# Patient Record
Sex: MALE | Race: BLACK OR AFRICAN AMERICAN | Employment: OTHER | URBAN - METROPOLITAN AREA
[De-identification: names, ages, dates, MRNs, and addresses within clinical notes are randomized per-mention and may not be internally consistent; named-entity substitution may affect disease eponyms.]

---

## 2018-10-10 ENCOUNTER — HOSPITAL ENCOUNTER (EMERGENCY)
Age: 33
Discharge: HOME | End: 2018-10-10
Attending: EMERGENCY MEDICINE
Payer: MEDICAID

## 2018-10-10 VITALS
SYSTOLIC BLOOD PRESSURE: 134 MMHG | TEMPERATURE: 99 F | DIASTOLIC BLOOD PRESSURE: 81 MMHG | RESPIRATION RATE: 18 BRPM | OXYGEN SATURATION: 98 % | HEART RATE: 88 BPM

## 2018-10-10 DIAGNOSIS — B34.9 VIRAL ILLNESS: Primary | ICD-10-CM

## 2018-10-10 PROCEDURE — 6370000000 HC RX 637 (ALT 250 FOR IP): Performed by: EMERGENCY MEDICINE

## 2018-10-10 PROCEDURE — 96372 THER/PROPH/DIAG INJ SC/IM: CPT

## 2018-10-10 PROCEDURE — 6360000002 HC RX W HCPCS: Performed by: EMERGENCY MEDICINE

## 2018-10-10 PROCEDURE — 99283 EMERGENCY DEPT VISIT LOW MDM: CPT

## 2018-10-10 RX ORDER — ALBUTEROL SULFATE 90 UG/1
2 AEROSOL, METERED RESPIRATORY (INHALATION) EVERY 6 HOURS PRN
Qty: 1 INHALER | Refills: 3 | Status: SHIPPED | OUTPATIENT
Start: 2018-10-10 | End: 2022-01-05

## 2018-10-10 RX ORDER — KETOROLAC TROMETHAMINE 30 MG/ML
30 INJECTION, SOLUTION INTRAMUSCULAR; INTRAVENOUS ONCE
Status: COMPLETED | OUTPATIENT
Start: 2018-10-10 | End: 2018-10-10

## 2018-10-10 RX ORDER — ONDANSETRON 8 MG/1
8 TABLET, ORALLY DISINTEGRATING ORAL EVERY 8 HOURS PRN
Qty: 10 TABLET | Refills: 0 | Status: SHIPPED | OUTPATIENT
Start: 2018-10-10 | End: 2019-12-29

## 2018-10-10 RX ORDER — ONDANSETRON 4 MG/1
4 TABLET, ORALLY DISINTEGRATING ORAL ONCE
Status: COMPLETED | OUTPATIENT
Start: 2018-10-10 | End: 2018-10-10

## 2018-10-10 RX ORDER — IPRATROPIUM BROMIDE AND ALBUTEROL SULFATE 2.5; .5 MG/3ML; MG/3ML
1 SOLUTION RESPIRATORY (INHALATION) ONCE
Status: COMPLETED | OUTPATIENT
Start: 2018-10-10 | End: 2018-10-10

## 2018-10-10 RX ADMIN — IPRATROPIUM BROMIDE AND ALBUTEROL SULFATE 1 AMPULE: .5; 3 SOLUTION RESPIRATORY (INHALATION) at 21:11

## 2018-10-10 RX ADMIN — ONDANSETRON 4 MG: 4 TABLET, ORALLY DISINTEGRATING ORAL at 21:12

## 2018-10-10 RX ADMIN — KETOROLAC TROMETHAMINE 30 MG: 30 INJECTION, SOLUTION INTRAMUSCULAR at 21:11

## 2018-10-10 ASSESSMENT — PAIN SCALES - GENERAL: PAINLEVEL_OUTOF10: 5

## 2018-10-11 NOTE — ED PROVIDER NOTES
process that require further workup at this time. His vital signs are stable no tachycardia, fever, or hypotension. Patient does not appear septic. I discussed IV, blood work, and medication versus just medication and close monitoring at home. Patient elected to forego blood work at this time, however we did discuss strict return precautions and close follow-up with his PCP for reevaluation. Both him and his sister in the room agree with the plan at this time as no further concerns or questions. ED Medication Orders     Start Ordered     Status Ordering Provider    10/10/18 2115 10/10/18 2106  ondansetron (ZOFRAN-ODT) disintegrating tablet 4 mg  ONCE      Ordered Yue RUIZ Child    10/10/18 2115 10/10/18 2106  ketorolac (TORADOL) injection 30 mg  Yue Nair Child    10/10/18 2115 10/10/18 2106  ipratropium-albuterol (DUONEB) nebulizer solution 1 ampule  ONCE      Ordered KATARZYNA RUIZ          Final Impression      1.  Viral illness      DISPOSITION Discharge - Pending Orders Complete   (Please note that portions of this note may have been completed with a voice recognition program. Efforts were made to edit the dictations but occasionally words are mis-transcribed.)    Arian Fox, DO  [facility], DO  10/10/18 6903

## 2019-12-29 ENCOUNTER — HOSPITAL ENCOUNTER (EMERGENCY)
Age: 34
Discharge: HOME | End: 2019-12-29
Payer: COMMERCIAL

## 2019-12-29 VITALS
RESPIRATION RATE: 19 BRPM | HEIGHT: 65 IN | WEIGHT: 132.5 LBS | BODY MASS INDEX: 22.08 KG/M2 | SYSTOLIC BLOOD PRESSURE: 133 MMHG | TEMPERATURE: 98.2 F | HEART RATE: 68 BPM | DIASTOLIC BLOOD PRESSURE: 91 MMHG | OXYGEN SATURATION: 99 %

## 2019-12-29 DIAGNOSIS — M62.838 TRAPEZIUS MUSCLE SPASM: ICD-10-CM

## 2019-12-29 DIAGNOSIS — M43.6 TORTICOLLIS: Primary | ICD-10-CM

## 2019-12-29 PROCEDURE — 99283 EMERGENCY DEPT VISIT LOW MDM: CPT

## 2019-12-29 RX ORDER — ACETAMINOPHEN 500 MG
1000 TABLET ORAL 4 TIMES DAILY PRN
Qty: 60 TABLET | Refills: 0 | Status: SHIPPED | OUTPATIENT
Start: 2019-12-29 | End: 2022-01-05

## 2019-12-29 RX ORDER — NAPROXEN 500 MG/1
500 TABLET ORAL 2 TIMES DAILY
Qty: 60 TABLET | Refills: 0 | Status: SHIPPED | OUTPATIENT
Start: 2019-12-29 | End: 2022-01-05

## 2019-12-29 RX ORDER — CYCLOBENZAPRINE HCL 10 MG
10 TABLET ORAL 3 TIMES DAILY PRN
Qty: 20 TABLET | Refills: 0 | Status: SHIPPED | OUTPATIENT
Start: 2019-12-29 | End: 2020-01-05

## 2019-12-29 ASSESSMENT — PAIN DESCRIPTION - ORIENTATION: ORIENTATION: LEFT

## 2019-12-29 ASSESSMENT — PAIN SCALES - GENERAL
PAINLEVEL_OUTOF10: 6
PAINLEVEL_OUTOF10: 6

## 2019-12-29 ASSESSMENT — PAIN DESCRIPTION - LOCATION: LOCATION: ARM;NECK;SHOULDER

## 2019-12-29 ASSESSMENT — PAIN DESCRIPTION - DESCRIPTORS: DESCRIPTORS: ACHING

## 2020-01-04 ENCOUNTER — HOSPITAL ENCOUNTER (EMERGENCY)
Age: 35
Discharge: HOME | End: 2020-01-04
Attending: EMERGENCY MEDICINE
Payer: COMMERCIAL

## 2020-01-04 VITALS
HEIGHT: 64 IN | OXYGEN SATURATION: 99 % | BODY MASS INDEX: 22.2 KG/M2 | SYSTOLIC BLOOD PRESSURE: 119 MMHG | RESPIRATION RATE: 16 BRPM | DIASTOLIC BLOOD PRESSURE: 71 MMHG | TEMPERATURE: 98.9 F | HEART RATE: 70 BPM | WEIGHT: 130 LBS

## 2020-01-04 PROCEDURE — 96372 THER/PROPH/DIAG INJ SC/IM: CPT

## 2020-01-04 PROCEDURE — 99282 EMERGENCY DEPT VISIT SF MDM: CPT

## 2020-01-04 PROCEDURE — 6360000002 HC RX W HCPCS: Performed by: EMERGENCY MEDICINE

## 2020-01-04 RX ORDER — KETOROLAC TROMETHAMINE 30 MG/ML
30 INJECTION, SOLUTION INTRAMUSCULAR; INTRAVENOUS ONCE
Status: COMPLETED | OUTPATIENT
Start: 2020-01-04 | End: 2020-01-04

## 2020-01-04 RX ORDER — LIDOCAINE 50 MG/G
1 PATCH TOPICAL DAILY
Qty: 10 PATCH | Refills: 0 | Status: SHIPPED | OUTPATIENT
Start: 2020-01-04 | End: 2020-01-14

## 2020-01-04 RX ADMIN — KETOROLAC TROMETHAMINE 30 MG: 30 INJECTION, SOLUTION INTRAMUSCULAR at 22:54

## 2020-01-04 ASSESSMENT — PAIN SCALES - GENERAL
PAINLEVEL_OUTOF10: 5
PAINLEVEL_OUTOF10: 9

## 2020-01-05 NOTE — ED PROVIDER NOTES
alert. Cooperative. No acute distress. HEAD: Normocephalic. Atraumatic. EYES: PERRL. EOM's grossly intact. No scleral icterus, injection or exudate  ENT: Mucous membranes are moist.  NECK: Supple. Normal ROM. Patient has tenderness to palpation primarily over his left trapezius and left deltoid muscle. Patient has no midline tenderness and minimal tenderness over the cervical paraspinal muscles. No meningismus. No neck mass. No lymphadenopathy. CHEST:  Regular rate and rhythm, no murmurs, rubs or gallops  LUNGS: Breathing is unlabored. Speaking comfortably in full sentences. Clear through auscultation bilaterally  ABDOMEN: Nondistended, nontender  EXTREMITIES: MAEE. No acute deformities. Patient has no tenderness to palpation of the left elbow or wrist.  Patient has no warmth or redness of the left shoulder joint and no pain in the shoulder with internal and external rotation with that normal drop arm test of the left shoulder. SKIN: Warm and dry. Cervical spine:  Normal sensation of the back of the head and neck bilaterally  Normal deltoid, biceps strength bilaterally  Normal strength with extension of the wrist and fingers bilaterally  Normal finger flexion strength bilaterally  Normal finger abduction strength bilaterally    Thoracic spine:  Normal sensation of the trunk bilaterally    Lumbar spine:  Normal sensation of the inner thigh bilaterally  Normal thigh adduction strength bilaterally  Normal knee extension bilaterally  Normal ankle and great toe dorsiflexion bilaterally  Normal Patellar 2/4 reflexes bilaterally  Normal plantarflexion of the toes bilaterally     ED COURSE/MDM  Cervical and trapezius strain: Patient was advised to continue his anti-inflammatory muscle relaxant and will be prescribed lidocaine patches to be used as well. Patient was advised to follow-up with physical therapy or massage therapy within the next week if symptoms are not improving.   Patient has no significant evidence of cervical radiculopathy. Patient was given scripts for the following medications. I counseled patient how to take these medications. New Prescriptions    LIDOCAINE (LIDODERM) 5 %    Place 1 patch onto the skin daily for 10 days 12 hours on, 12 hours off. CLINICAL IMPRESSION  1. Trapezius strain, left, sequela        Blood pressure 121/79, pulse 78, temperature 98.9 °F (37.2 °C), temperature source Oral, resp. rate 16, height 5' 4\" (1.626 m), weight 130 lb (59 kg), SpO2 99 %.       Follow-up with:  [address] [place] [address]              Delma Quispe MD  01/04/20 5908

## 2020-01-17 ENCOUNTER — OFFICE VISIT (OUTPATIENT)
Dept: ORTHOPEDIC SURGERY | Age: 35
End: 2020-01-17
Payer: COMMERCIAL

## 2020-01-17 VITALS
SYSTOLIC BLOOD PRESSURE: 129 MMHG | HEIGHT: 64 IN | DIASTOLIC BLOOD PRESSURE: 77 MMHG | WEIGHT: 130 LBS | TEMPERATURE: 97.8 F | HEART RATE: 77 BPM | BODY MASS INDEX: 22.2 KG/M2

## 2020-01-17 PROBLEM — M54.2 NECK PAIN: Status: ACTIVE | Noted: 2020-01-17

## 2020-01-17 PROBLEM — M54.12 CERVICAL RADICULAR PAIN: Status: ACTIVE | Noted: 2020-01-17

## 2020-01-17 PROBLEM — M50.322 DEGENERATION OF C5-C6 INTERVERTEBRAL DISC: Status: ACTIVE | Noted: 2020-01-17

## 2020-01-17 PROCEDURE — 1036F TOBACCO NON-USER: CPT | Performed by: PHYSICIAN ASSISTANT

## 2020-01-17 PROCEDURE — G8420 CALC BMI NORM PARAMETERS: HCPCS | Performed by: PHYSICIAN ASSISTANT

## 2020-01-17 PROCEDURE — 99203 OFFICE O/P NEW LOW 30 MIN: CPT | Performed by: PHYSICIAN ASSISTANT

## 2020-01-17 PROCEDURE — G8427 DOCREV CUR MEDS BY ELIG CLIN: HCPCS | Performed by: PHYSICIAN ASSISTANT

## 2020-01-17 PROCEDURE — G8484 FLU IMMUNIZE NO ADMIN: HCPCS | Performed by: PHYSICIAN ASSISTANT

## 2020-01-17 RX ORDER — CYCLOBENZAPRINE HCL 10 MG
10 TABLET ORAL 3 TIMES DAILY PRN
Qty: 90 TABLET | Refills: 0 | Status: SHIPPED | OUTPATIENT
Start: 2020-01-17 | End: 2020-02-16

## 2020-01-17 RX ORDER — METHYLPREDNISOLONE 4 MG/1
TABLET ORAL
Qty: 1 KIT | Refills: 0 | Status: SHIPPED | OUTPATIENT
Start: 2020-01-17 | End: 2022-01-05

## 2020-01-17 NOTE — LETTER
[facility]  [facility] [address] [address]  Phone: [phone]  Fax: [phone]    Basilio Moran, [address]        January 17, 2020     Patient: Jacqueline Rubio   YOB: 1985   Date of Visit: 1/17/2020       To Whom It May Concern: It is my medical opinion that Jacqueline Rubio may return to light duty immediately with the following restrictions: lifting/carrying not to exceed 5 lbs. , pushing/pulling not to exceed 5 lbs. If you have any questions or concerns, please don't hesitate to call.     Sincerely,          SHELLIE Snow

## 2020-01-20 NOTE — PROGRESS NOTES
needed for Muscle spasms 90 tablet 0    naproxen (NAPROSYN) 500 MG tablet Take 1 tablet by mouth 2 times daily 60 tablet 0    acetaminophen (TYLENOL) 500 MG tablet Take 2 tablets by mouth 4 times daily as needed for Pain 60 tablet 0    albuterol sulfate HFA (PROAIR HFA) 108 (90 Base) MCG/ACT inhaler Inhale 2 puffs into the lungs every 6 hours as needed for Wheezing 1 Inhaler 3     No current facility-administered medications for this visit. Objective:     He is alert, oriented x 3, pleasant, well nourished, developed and in no   acute distress. /77   Pulse 77   Temp 97.8 °F (36.6 °C)   Ht 5' 4\" (1.626 m)   Wt 130 lb (59 kg)   BMI 22.31 kg/m²      Cervical Spine Exam:  There is not deformity. There is not loss of motion. There is  muscular spasm. There is  trapezius/ rhomboid tenderness. There is not spinous process tenderness. There is not cervical lymphadenopathy. Spurling Test is Positive. Examination of the left shoulder shows: There is no deformity. There is no erythema. There is no  soft tissue swelling. Deltoid region is not tender to palpation. AC Joint is not tender to palpation. Clavicle is not tender to palpation. Bicipital Groove is not  tender to palpation. Pectoralis  is not tender to palpation. Scapula/ trapezius is not tender to palpation. There is no weakness with supraspinatus testing. There is no pain with supraspinatus testing. Yergason Test negative. Drop Arm Test negative. Apprehension Test negative. Cross Arm Test negative. Shoulder ROM-      Full range of motion in all planes without pain or instability. Examination of the upper extremities are intact with sensation to light touch. Motor testing  5/5 in all major motor groups including hand intrinsics. DTRs-left biceps 1+ whereas all others including right biceps, bilateral brachioradialis and triceps 2+. Radial, Median and Ulnar nerves are intact. Horan's Sign absent.

## 2020-01-21 ENCOUNTER — HOSPITAL ENCOUNTER (OUTPATIENT)
Dept: PHYSICAL THERAPY | Age: 35
Setting detail: THERAPIES SERIES
Discharge: HOME | End: 2020-01-21
Payer: COMMERCIAL

## 2020-01-21 PROCEDURE — 97012 MECHANICAL TRACTION THERAPY: CPT

## 2020-01-21 PROCEDURE — 97161 PT EVAL LOW COMPLEX 20 MIN: CPT

## 2020-01-21 PROCEDURE — 97035 APP MDLTY 1+ULTRASOUND EA 15: CPT

## 2020-01-21 PROCEDURE — 97530 THERAPEUTIC ACTIVITIES: CPT

## 2020-01-21 NOTE — PROGRESS NOTES
Physical Therapy  Initial Assessment  Date: 2020  Patient Name: Trinh Casanova  MRN: [id number]  : 1985     Treatment Diagnosis: neck pain with L UE radicular pain; dec ROM and strength; dec postural awareness     Restrictions  Position Activity Restriction  Other position/activity restrictions: no fall risk     Subjective   General  Chart Reviewed: Yes  Patient assessed for rehabilitation services?: Yes  Additional Pertinent Hx: asthma, PCN allergy   Family / Caregiver Present: No  Referring Practitioner: Angie Alvarado   Referral Date : 20  Diagnosis: cervical radicular pain; degeneration C5-6 disc   General Comment  Comments: xrays show DDD at C5-6; if no relief with PT will possible try GIN   PT Visit Information  Onset Date: 12/15/19  PT Insurance Information: [facility]   Total # of Visits Approved: 8  Total # of Visits to Date: 1  Progress Note Counter: 1/10  Subjective  Subjective: 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 Since it started, pain is intermittent and at times 0/10 and up to 6/10. When it starts, it can last for hours and he also gets L UE pain and fingertips. Certain neck positions increase his UE pain ( extension ). Has not tried MHP or CP, but has tried an icy hot with some relief.        Objective     Observation/Palpation  Palpation: TTP - no specific TTP ( points out pain being in UT area when hurting)     AROM RUE (degrees)  RUE AROM : WNL  AROM LUE (degrees)  LUE AROM : WFL  LUE General AROM: mild strain with IR felt   Spine  Cervical: flex 50 no s/s; ext 50 with inc in L upper arm pain; L SB 45 no pain; R SB 45 no pain; L Rot 80 no c/o;  R Rot 70 with mild tingling in L hand     Strength RUE  Strength RUE: WNL  Strength LUE  Strength LUE: WNL        Assessment   Conditions Requiring Skilled Therapeutic Intervention  Body structures, Functions, Activity limitations: Decreased functional mobility ;Decreased high-level IADLs;Decreased ADL status; Increased pain;Decreased ROM  Assessment: prior level of function: pt able to sleep and work, play with kids without pain; DX: cervical radicular pain, degeneration of C5-6 intervertebral disc   Treatment Diagnosis: neck pain with L UE radicular pain; dec ROM and strength; dec postural awareness   Prognosis: Good  Decision Making: Low Complexity  History: see PMH  Exam: see eval   Clinical Presentation: stable   REQUIRES PT FOLLOW UP: Yes  Activity Tolerance  Activity Tolerance: Patient Tolerated treatment well         Plan   Plan  Times per week: 2  Plan weeks: 4  Current Treatment Recommendations: Strengthening, Manual Therapy - Joint Manipulation, Patient/Caregiver Education & Training, ROM, Modalities, Pain Management, Home Exercise Program, Manual Therapy - Soft Tissue Mobilization, Safety Education & Training    OutComes Score  Neck Disability Index Raw Score: 15 (01/21/20 1056)                     Goals  Short term goals  Time Frame for Short term goals: 2 wks  Short term goal 1: pain overall 20-30% improved per pt   Short term goal 2: pt notes inc awareness of posure with work and ADLS   Long term goals  Time Frame for Long term goals : discharge  Long term goal 1: pain overall 50-60% improved per pt for ease with sleep and childcare   Long term goal 2: pt independent with hep   Long term goal 3: full ROM neck and L UE without discomfort   Patient Goals   Patient goals : \" to make my body feel better for I can get to normal life and be involved with kids\"     Leandro Stevenson, [id number]

## 2020-01-23 ENCOUNTER — APPOINTMENT (OUTPATIENT)
Dept: PHYSICAL THERAPY | Age: 35
End: 2020-01-23
Payer: COMMERCIAL

## 2020-01-23 ENCOUNTER — HOSPITAL ENCOUNTER (OUTPATIENT)
Dept: PHYSICAL THERAPY | Age: 35
Setting detail: THERAPIES SERIES
Discharge: HOME | End: 2020-01-23
Payer: COMMERCIAL

## 2020-01-23 PROCEDURE — 97035 APP MDLTY 1+ULTRASOUND EA 15: CPT

## 2020-01-23 PROCEDURE — 97110 THERAPEUTIC EXERCISES: CPT

## 2020-01-23 PROCEDURE — 97140 MANUAL THERAPY 1/> REGIONS: CPT

## 2020-01-23 NOTE — FLOWSHEET NOTE
shoulder which stopped as soon as c-tx stopped     1/23: discomfort L shoulder afterwards. Consider fewer pulls next visit        MHP after  12 min                Other Therapeutic Activities:  Pt was educated on PT POC, Diagnosis, Prognosis, pathomechanics as well as frequency and duration of scheduling future physical therapy appointments. Time was also taken on this day to answer all patient questions and participation in PT. Reviewed appointment policy in detail with patient and patient verbalized understanding. Home Exercise Program: Patient was instructed in the above for HEP: . Patient verbalized/demonstrated understanding and was issued written handout. Charges: Therapeutic Exercise:  [] (67566) Provided verbal/tactile cueing for activities to restore or maintain strength, flexibility, endurance, ROM for improvements with self-care, mobility, lifting and ambulation. Neuromuscular Re-Education  [] (76982) Provided verbal/tactile cueing for activities to restore or maintain balance, coordination, kinesthetic sense, posture, motor skill, proprioception for self-care, mobility, lifting, and ambulation. Therapeutic Activities:    [x] (33757) Provided verbal/tactile cueing to address functional limitations related to loss of mobility, strength, balance, and coordination.      Gait Training:  [] (46639) Provided training and instruction to the patient for proper postural muscle recruitment and positioning with ambulation re-education     Home Exercise Program:    [x] (97027) Reviewed/Progressed HEP activities related to strengthening, flexibility, endurance, ROM for functional self-care, mobility, lifting and ambulation   [] (03302) Reviewed/Progressed HEP activities related to improving balance, coordination, kinesthetic sense, posture, motor skill, proprioception for self-care, mobility, lifting, and ambulation      Manual Treatments:  MFR / STM / Oscillations-Mobs:  G-I, II, III, IV /

## 2020-01-28 ENCOUNTER — HOSPITAL ENCOUNTER (OUTPATIENT)
Dept: PHYSICAL THERAPY | Age: 35
Setting detail: THERAPIES SERIES
Discharge: HOME | End: 2020-01-28
Payer: COMMERCIAL

## 2020-01-28 NOTE — FLOWSHEET NOTE
Physical Therapy  Cancellation/No-show Note  Patient Name:  Paris Rutledge  :  1985   Date:  2020  Cancelled visits to date: 1  No-shows to date: 0    For today's appointment patient:  [x]  Cancelled  []  Rescheduled appointment  []  No-show     Reason given by patient:  []  Patient ill  []  Conflicting appointment  [x]  No transportation - having car trouble    []  Conflict with work  []  No reason given  []  Other:     Comments:      Electronically signed by:  Olivia Rodriguez PTA

## 2020-01-30 ENCOUNTER — HOSPITAL ENCOUNTER (OUTPATIENT)
Dept: PHYSICAL THERAPY | Age: 35
Setting detail: THERAPIES SERIES
Discharge: HOME | End: 2020-01-30
Payer: COMMERCIAL

## 2020-01-30 PROCEDURE — 97035 APP MDLTY 1+ULTRASOUND EA 15: CPT | Performed by: CHIROPRACTOR

## 2020-01-30 PROCEDURE — 97140 MANUAL THERAPY 1/> REGIONS: CPT | Performed by: CHIROPRACTOR

## 2020-01-30 NOTE — FLOWSHEET NOTE
Physical Therapy Daily Treatment Note  Date:  2020    Patient Name:  Paris Rutledge    :  1985  MRN: [id number]    Restrictions/Precautions: Position Activity Restriction  Other position/activity restrictions: no fall risk     Pertinent Medical History: Additional Pertinent Hx: asthma, PCN allergy     Medical/Treatment Diagnosis Information:  · Diagnosis: cervical radicular pain; degeneration C5-6 disc   · Treatment Diagnosis: neck pain with L UE radicular pain; dec ROM and strength; dec postural awareness     Insurance/Certification information:  PT Insurance Information: [facility]   Physician Information:  Referring Practitioner: Chiqui Tovar   Plan of care signed (Y/N):  Sent to inbox    Visit# / total visits:    Pain level: 2/10     Functional Outcomes Measure: at eval  Test: NDI  Score: 15    History of Injury: Subjective  Subjective: 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 Since it started, pain is intermittent and at times 0/10 and up to 6/10. When it starts, it can last for hours and he also gets L UE pain and fingertips. Certain neck positions increase his UE pain ( extension ). Has not tried MHP or CP, but has tried an icy hot with some relief. Subjective:   Just has a funny feeling in his L Upper arm; numbness/tingling.  Neck and shoulder is doing pretty good    Objective:   Observation:    Test measurements:      Exercises:  Exercise/Equipment Resistance/Repetitions Other comments        L UT str No stretch felt    L LS str 3 x 20 sec    Cervical retraction 5 sec x 10    TB Row        Ext  Green x10  Green x10    pec str 3 x 20 sec         STM L UT/CPVM at C5-6, C6-7 X 10 min    % 1.5 w/cm2 to CPVM  X 8 min With relief of pain         [facility]        Trial manual c-tx          Neck straight - 5 x 20 sec  Neck slight R SB -  Stopped after 7 min due to pt c/o inc \"uncomfortable\" pain in L shoulder which stopped as soon as c-tx stopped     1/23: discomfort L shoulder afterwards. Consider fewer pulls next visit     States that pain increases in Supine   MHP after  12 min      Feels better after HP          Other Therapeutic Activities:  Pt was educated on PT POC, Diagnosis, Prognosis, pathomechanics as well as frequency and duration of scheduling future physical therapy appointments. Time was also taken on this day to answer all patient questions and participation in PT. Reviewed appointment policy in detail with patient and patient verbalized understanding. Home Exercise Program: Patient was instructed in the above for HEP: . Patient verbalized/demonstrated understanding and was issued written handout. Charges: Therapeutic Exercise:  [] (43505) Provided verbal/tactile cueing for activities to restore or maintain strength, flexibility, endurance, ROM for improvements with self-care, mobility, lifting and ambulation. Neuromuscular Re-Education  [] (57711) Provided verbal/tactile cueing for activities to restore or maintain balance, coordination, kinesthetic sense, posture, motor skill, proprioception for self-care, mobility, lifting, and ambulation. Therapeutic Activities:    [x] (03597) Provided verbal/tactile cueing to address functional limitations related to loss of mobility, strength, balance, and coordination.      Gait Training:  [] (02413) Provided training and instruction to the patient for proper postural muscle recruitment and positioning with ambulation re-education     Home Exercise Program:    [x] (73299) Reviewed/Progressed HEP activities related to strengthening, flexibility, endurance, ROM for functional self-care, mobility, lifting and ambulation   [] (43443) Reviewed/Progressed HEP activities related to improving balance, coordination, kinesthetic sense, posture, motor skill, proprioception for self-care, mobility, lifting, and ambulation      Manual Treatments:  MFR

## 2020-02-04 ENCOUNTER — HOSPITAL ENCOUNTER (OUTPATIENT)
Dept: PHYSICAL THERAPY | Age: 35
Setting detail: THERAPIES SERIES
Discharge: HOME | End: 2020-02-04
Payer: COMMERCIAL

## 2020-02-04 PROCEDURE — 97140 MANUAL THERAPY 1/> REGIONS: CPT

## 2020-02-04 PROCEDURE — 97035 APP MDLTY 1+ULTRASOUND EA 15: CPT

## 2020-02-04 NOTE — FLOWSHEET NOTE
Physical Therapy Daily Treatment Note  Date:  2020    Patient Name:  Ira Dunn    :  1985  MRN: [id number]    Restrictions/Precautions: Position Activity Restriction  Other position/activity restrictions: no fall risk     Pertinent Medical History: Additional Pertinent Hx: asthma, PCN allergy     Medical/Treatment Diagnosis Information:  · Diagnosis: cervical radicular pain; degeneration C5-6 disc   · Treatment Diagnosis: neck pain with L UE radicular pain; dec ROM and strength; dec postural awareness     Insurance/Certification information:  PT Insurance Information: [facility]   Physician Information:  Referring Practitioner: Jose Antonio Rubio   Plan of care signed (Y/N):  Sent to inbox    Visit# / total visits:  3/8 (running 15 min late)  Pain level: 1-2/10     Functional Outcomes Measure: at eval  Test: NDI  Score: 15    History of Injury: Subjective  Subjective: 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 Since it started, pain is intermittent and at times 0/10 and up to 6/10. When it starts, it can last for hours and he also gets L UE pain and fingertips. Certain neck positions increase his UE pain ( extension ). Has not tried MHP or CP, but has tried an icy hot with some relief. Subjective:   Feels PT is helping, pain is not as frequent as it used to be. Now having s/s 3-4 times a week instead of every morning. Not really a pain, but a tingling in shoulder now.      Objective:   Observation:    Test measurements:      Exercises:  Exercise/Equipment Resistance/Repetitions Other comments        L UT str No stretch felt    L LS str 3 x 20 sec    Cervical retraction 5 sec x 10    TB Row        Ext  Green x10  Green x10 2/4 issued blue TB    pec str 3 x 20 sec         STM L UT/CPVM at C5-6, C6-7 X 8 min    % 1.5 w/cm2 to CPVM  X 8 min No tingling in L UE after STM/US         trial [facility] c-tx with MHP        Trial manual c-tx           Stopped after 7 min due to pt c/o inc \"uncomfortable\" pain in L shoulder which stopped as soon as c-tx stopped     1/23: discomfort L shoulder afterwards. Consider fewer pulls next visit     States that pain increases in Supine   MHP after  15 min draped over L UT seated       Feels better after HP          Other Therapeutic Activities:  Pt was educated on PT POC, Diagnosis, Prognosis, pathomechanics as well as frequency and duration of scheduling future physical therapy appointments. Time was also taken on this day to answer all patient questions and participation in PT. Reviewed appointment policy in detail with patient and patient verbalized understanding. Home Exercise Program: Patient was instructed in the above for HEP: . Patient verbalized/demonstrated understanding and was issued written handout. Charges: Therapeutic Exercise:  [] (16645) Provided verbal/tactile cueing for activities to restore or maintain strength, flexibility, endurance, ROM for improvements with self-care, mobility, lifting and ambulation. Neuromuscular Re-Education  [] (48437) Provided verbal/tactile cueing for activities to restore or maintain balance, coordination, kinesthetic sense, posture, motor skill, proprioception for self-care, mobility, lifting, and ambulation. Therapeutic Activities:    [x] (39919) Provided verbal/tactile cueing to address functional limitations related to loss of mobility, strength, balance, and coordination.      Gait Training:  [] (65345) Provided training and instruction to the patient for proper postural muscle recruitment and positioning with ambulation re-education     Home Exercise Program:    [x] (98453) Reviewed/Progressed HEP activities related to strengthening, flexibility, endurance, ROM for functional self-care, mobility, lifting and ambulation   [] (04556) Reviewed/Progressed HEP activities related to improving balance, coordination, kinesthetic sense, posture,

## 2020-02-06 ENCOUNTER — HOSPITAL ENCOUNTER (OUTPATIENT)
Dept: PHYSICAL THERAPY | Age: 35
Setting detail: THERAPIES SERIES
Discharge: HOME | End: 2020-02-06
Payer: COMMERCIAL

## 2020-02-06 PROCEDURE — 97140 MANUAL THERAPY 1/> REGIONS: CPT

## 2020-02-06 PROCEDURE — 97110 THERAPEUTIC EXERCISES: CPT

## 2020-02-06 PROCEDURE — 97035 APP MDLTY 1+ULTRASOUND EA 15: CPT

## 2020-02-06 NOTE — FLOWSHEET NOTE
Physical Therapy Daily Treatment Note  Date:  2020    Patient Name:  Virginia Ruggiero    :  1985  MRN: [id number]    Restrictions/Precautions: Position Activity Restriction  Other position/activity restrictions: no fall risk     Pertinent Medical History: Additional Pertinent Hx: asthma, PCN allergy     Medical/Treatment Diagnosis Information:  · Diagnosis: cervical radicular pain; degeneration C5-6 disc   · Treatment Diagnosis: neck pain with L UE radicular pain; dec ROM and strength; dec postural awareness     Insurance/Certification information:  PT Insurance Information: [facility]   Physician Information:  Referring Practitioner: Vicky Longoria   Plan of care signed (Y/N):  Sent to inbox    Visit# / total visits:    Pain level: 1/10     Functional Outcomes Measure: at eval  Test: NDI  Score: 15    History of Injury: Subjective  Subjective: 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 Since it started, pain is intermittent and at times 0/10 and up to 6/10. When it starts, it can last for hours and he also gets L UE pain and fingertips. Certain neck positions increase his UE pain ( extension ). Has not tried MHP or CP, but has tried an icy hot with some relief.       Subjective:   Hasn't been having much pain, just a little tingling in his L biceps  Objective:   Observation:    Test measurements:      Exercises:  Exercise/Equipment Resistance/Repetitions Other comments        L UT str No stretch felt    L LS str 3 x 20 sec    Cervical retraction 5 sec x 10    TB Row        Ext  Green 2x10  Green 2x10 2/4 issued blue TB    pec str 3 x 20 sec         STM L UT/CPVM at C5-6, C6-7 X 8 min    % 1.5 w/cm2 to CPVM  X 8 min No tingling in L UE after STM/US         trial [facility] c-tx with MHP        Trial manual c-tx           Stopped after 7 min due to pt c/o inc \"uncomfortable\" pain in L shoulder which stopped as soon as c-tx stopped 1/23: discomfort L shoulder afterwards. Consider fewer pulls next visit     States that pain increases in Supine   MHP after  15 min draped over L UT seated       Feels better after HP   kiniseotape L UT + (band-aid)      Other Therapeutic Activities:  Pt was educated on PT POC, Diagnosis, Prognosis, pathomechanics as well as frequency and duration of scheduling future physical therapy appointments. Time was also taken on this day to answer all patient questions and participation in PT. Reviewed appointment policy in detail with patient and patient verbalized understanding. Home Exercise Program: Patient was instructed in the above for HEP: . Patient verbalized/demonstrated understanding and was issued written handout. Charges: Therapeutic Exercise:  [] (13210) Provided verbal/tactile cueing for activities to restore or maintain strength, flexibility, endurance, ROM for improvements with self-care, mobility, lifting and ambulation. Neuromuscular Re-Education  [] (57439) Provided verbal/tactile cueing for activities to restore or maintain balance, coordination, kinesthetic sense, posture, motor skill, proprioception for self-care, mobility, lifting, and ambulation. Therapeutic Activities:    [x] (08265) Provided verbal/tactile cueing to address functional limitations related to loss of mobility, strength, balance, and coordination.      Gait Training:  [] (64841) Provided training and instruction to the patient for proper postural muscle recruitment and positioning with ambulation re-education     Home Exercise Program:    [x] (77924) Reviewed/Progressed HEP activities related to strengthening, flexibility, endurance, ROM for functional self-care, mobility, lifting and ambulation   [] (59096) Reviewed/Progressed HEP activities related to improving balance, coordination, kinesthetic sense, posture, motor skill, proprioception for self-care, mobility, lifting, and ambulation      Manual

## 2020-02-11 ENCOUNTER — HOSPITAL ENCOUNTER (OUTPATIENT)
Dept: PHYSICAL THERAPY | Age: 35
Setting detail: THERAPIES SERIES
Discharge: HOME | End: 2020-02-11
Payer: COMMERCIAL

## 2020-02-11 PROCEDURE — 97035 APP MDLTY 1+ULTRASOUND EA 15: CPT

## 2020-02-11 PROCEDURE — 97140 MANUAL THERAPY 1/> REGIONS: CPT

## 2020-02-11 PROCEDURE — 97530 THERAPEUTIC ACTIVITIES: CPT

## 2020-02-11 NOTE — PROGRESS NOTES
Outpatient Physical Therapy  [] [place]    Phone: [phone]   Fax: [phone]   [] [facility]  Phone: [phone]   Fax: [phone]  [] [facility]              Phone: [phone]   Fax: [phone]     Physical Therapy Progress/Discharge Note  Date: 2020        Patient Name:  Cuong Rush    :  1985  MRN: [id number]  Restrictions/Precautions: Position Activity Restriction  Other position/activity restrictions: no fall risk     Pertinent Medical History: Additional Pertinent Hx: asthma, PCN allergy      Medical/Treatment Diagnosis Information:  · Diagnosis: cervical radicular pain; degeneration C5-6 disc   · Treatment Diagnosis: neck pain with L UE radicular pain; dec ROM and strength; dec postural awareness      Insurance/Certification information:  PT Insurance Information: [facility] [place]   Physician Information:  Referring Practitioner: John Seay   Plan of care signed (Y/N):  Sent to inbox     Visit# / total visits:    Pain level:      1/10            Time Period for Report:  20-20  Cancels/No-shows to date:  1    Plan of Care/Treatment to date:  [x] Therapeutic Exercise    [x] Modalities:  [x] Therapeutic Activity     [x] Ultrasound  [] Electrical Stimulation  [] Gait Training      [] Cervical Traction    [] Lumbar Traction  [] Neuromuscular Re-education  [] Cold/hotpack [] Iontophoresis  [x] Instruction in HEP      Other:  [x] Manual Therapy       []    [] Aquatic Therapy       []                          Significant Findings At Last Visit/Comments:     Progress Note (20)  * PT is helping; relief after sessions lasts for the rest of the day  * overall feels 75-80% improved  * no longer having neck pain; only a tightness now and the uncomfortable sensation L upper arm  * NT in L bicep/deltoid is less and intermittent now, not constant; more of an uncomfortable pressure   * inc postural awareness with work and ADLS   * sleep is better, but still having some bad

## 2020-02-13 ENCOUNTER — HOSPITAL ENCOUNTER (OUTPATIENT)
Dept: PHYSICAL THERAPY | Age: 35
Setting detail: THERAPIES SERIES
Discharge: HOME | End: 2020-02-13
Payer: COMMERCIAL

## 2020-02-13 PROCEDURE — 97035 APP MDLTY 1+ULTRASOUND EA 15: CPT

## 2020-02-13 PROCEDURE — 97140 MANUAL THERAPY 1/> REGIONS: CPT

## 2020-02-13 NOTE — FLOWSHEET NOTE
Physical Therapy Daily Treatment Note  Date:  2020    Patient Name:  Brice Gonzalez    :  1985  MRN: [id number]    Restrictions/Precautions: Position Activity Restriction  Other position/activity restrictions: no fall risk     Pertinent Medical History: Additional Pertinent Hx: asthma, PCN allergy     Medical/Treatment Diagnosis Information:  · Diagnosis: cervical radicular pain; degeneration C5-6 disc   · Treatment Diagnosis: neck pain with L UE radicular pain; dec ROM and strength; dec postural awareness     Insurance/Certification information:  PT Insurance Information: [facility]   Physician Information:  Referring Practitioner: Sandoval Goodrich   Plan of care signed (Y/N):  Sent to inbox    Visit# / total visits:    Pain level: 0/10     Functional Outcomes Measure: at eval  Test: NDI  Score: 15    History of Injury: Subjective  Subjective: 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 Since it started, pain is intermittent and at times 0/10 and up to 6/10. When it starts, it can last for hours and he also gets L UE pain and fingertips. Certain neck positions increase his UE pain ( extension ). Has not tried MHP or CP, but has tried an icy hot with some relief. Subjective:   Feeling pretty good, no tingling, just a small spot L bicep area. Doesn't even feel really tight or tense right now.      Progress Note (20)  * PT is helping; relief after sessions lasts for the rest of the day  * overall feels 75-80% improved  * no longer having neck pain; only a tightness now and the uncomfortable sensation L upper arm  * NT in L bicep/deltoid is less and intermittent now, not constant; more of an uncomfortable pressure   * inc postural awareness with work and ADLS   * sleep is better, but still having some bad nights; difficulty laying on L shoulder still  * neck ROM WNL only movement that inc s/s is R Other:  Functional assessment: tightness improving   Prognosis: [x] Good [] Fair  [] Poor    Goals:    Short term goals  Time Frame for Short term goals: 2 wks  Short term goal 1: pain overall 20-30% improved per pt   Short term goal 2: pt notes inc awareness of posure with work and [address] term goals  Time Frame for Long term goals : discharge  Long term goal 1: pain overall 50-60% improved per pt for ease with sleep and childcare   Long term goal 2: pt independent with hep   Long term goal 3: full ROM neck and L UE without discomfort      Patient Requires Follow-up: [x] Yes  [] No    Plan:   [x] Continue per plan of care [] Alter current plan (see comments)  [] Plan of care initiated [] Hold pending MD visit [] Discharge    Plan for Next Session:  Add above as stated; cont x 2 wks    Electronically signed by:  Maricruz Samaniego, [id number] [address]

## 2020-02-20 ENCOUNTER — HOSPITAL ENCOUNTER (OUTPATIENT)
Dept: PHYSICAL THERAPY | Age: 35
Setting detail: THERAPIES SERIES
Discharge: HOME | End: 2020-02-20
Payer: COMMERCIAL

## 2020-02-20 PROCEDURE — 97035 APP MDLTY 1+ULTRASOUND EA 15: CPT

## 2020-02-20 PROCEDURE — 97140 MANUAL THERAPY 1/> REGIONS: CPT

## 2020-02-20 NOTE — FLOWSHEET NOTE
Physical Therapy Daily Treatment Note  Date:  2020    Patient Name:  Franco Solis    :  1985  MRN: [id number]    Restrictions/Precautions: Position Activity Restriction  Other position/activity restrictions: no fall risk     Pertinent Medical History: Additional Pertinent Hx: asthma, PCN allergy     Medical/Treatment Diagnosis Information:  · Diagnosis: cervical radicular pain; degeneration C5-6 disc   · Treatment Diagnosis: neck pain with L UE radicular pain; dec ROM and strength; dec postural awareness     Insurance/Certification information:  PT Insurance Information: [facility]   Physician Information:  Referring Practitioner: Jaime Mcgovern   Plan of care signed (Y/N):  Sent to inbox    Visit# / total visits:    Pain level: 0/10     Functional Outcomes Measure: at eval  Test: NDI  Score: 15    History of Injury: Subjective  Subjective: 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 Since it started, pain is intermittent and at times 0/10 and up to 6/10. When it starts, it can last for hours and he also gets L UE pain and fingertips. Certain neck positions increase his UE pain ( extension ). Has not tried MHP or CP, but has tried an icy hot with some relief. Subjective:   Mild tingling L UE just came on now as he was waiting to come back to PT. Overall, less frequency of UE tingling noted. Relief after PT for most of the whole day.      Progress Note (20)  * PT is helping; relief after sessions lasts for the rest of the day  * overall feels 75-80% improved  * no longer having neck pain; only a tightness now and the uncomfortable sensation L upper arm  * NT in L bicep/deltoid is less and intermittent now, not constant; more of an uncomfortable pressure   * inc postural awareness with work and ADLS   * sleep is better, but still having some bad nights; difficulty laying on L shoulder still  * neck ROM WNL only movement that inc s/s is R rot      Objective:   Observation:    Test measurements:      Exercises:  Exercise/Equipment Resistance/Repetitions Other comments        L UT str No stretch felt    L LS str Hep - done this morning    Cervical retraction    TB Row        Ext  2/4 issued blue TB    pec str  Performed 2/11 - hadn't done yet   Cervical isometrics  For hep         STM L UT/CPVM at C5-6, C6-7 IASTM X 8 min    B Rot after treatment without N/T Demo use of theracane x 3 min and issued info on where to purchase if intertested    % 1.5 w/cm2 to CPVM  X 8 min No tingling in L UE after STM/US         trial again manual mobs and cerv ROM since pt feeling better        trial mech c-tx with MHP        Trial manual c-tx           D/C all attempts             Stopped after 7 min due to pt c/o inc \"uncomfortable\" pain in L shoulder which stopped as soon as c-tx stopped     1/23: discomfort L shoulder afterwards. Consider fewer pulls next visit     States that pain increases in Supine   MHP after  15 min around neck seated       Feels better after HP   kiniseotape  Declined, feels good right now      Other Therapeutic Activities:  Pt was educated on PT POC, Diagnosis, Prognosis, pathomechanics as well as frequency and duration of scheduling future physical therapy appointments. Time was also taken on this day to answer all patient questions and participation in PT. Reviewed appointment policy in detail with patient and patient verbalized understanding. Home Exercise Program: Patient was instructed in the above for HEP: . Patient verbalized/demonstrated understanding and was issued written handout. Charges: Therapeutic Exercise:  [] (17033) Provided verbal/tactile cueing for activities to restore or maintain strength, flexibility, endurance, ROM for improvements with self-care, mobility, lifting and ambulation.     Neuromuscular Re-Education  [] (02950) Provided verbal/tactile cueing for activities to restore or maintain balance, coordination, kinesthetic sense, posture, motor skill, proprioception for self-care, mobility, lifting, and ambulation. Therapeutic Activities:    [x] (54373) Provided verbal/tactile cueing to address functional limitations related to loss of mobility, strength, balance, and coordination. Gait Training:  [] (37066) Provided training and instruction to the patient for proper postural muscle recruitment and positioning with ambulation re-education     Home Exercise Program:    [x] (39675) Reviewed/Progressed HEP activities related to strengthening, flexibility, endurance, ROM for functional self-care, mobility, lifting and ambulation   [] (71023) Reviewed/Progressed HEP activities related to improving balance, coordination, kinesthetic sense, posture, motor skill, proprioception for self-care, mobility, lifting, and ambulation      Manual Treatments:  MFR / STM / Oscillations-Mobs:  G-I, II, III, IV / Manipulation / MLD  [x] (59470) Provided manual therapy to mobilize  soft tissue/joints/fluid for the purpose of modulating pain, promoting relaxation, increasing ROM, reducing/eliminating soft tissue swelling/inflammation/restriction, improving soft tissue extensibility and allowing for proper ROM for normal function with self- care, mobility, lifting and ambulation.       Timed Code Treatment Minutes: 30   Total Treatment Minutes: 45     [] EVAL (LOW) 42615   [] EVAL (MOD) 16529   [] EVAL (HIGH) 73550   [] RE-EVAL   [] TE (78563) x     [] Aquatic (95825) x  [] NMR (45836)   x  [] Aquatic Group (43367) x  [x] Manual (56389) x    [x] Ultrasound (91092) x  [] TA (62941) x1  [] Mech Traction (19874)  [] Ionto (05688)           [] ES (un) (28778):   [] Vasopump (31148) [] Other:      Assessment:  [x] Patient tolerated treatment well [] Patient limited by fatigue  [] Patient limited by pain  [] Patient limited by other medical complications  [x] Other:  Functional assessment: Can turn head to R now

## 2020-02-25 ENCOUNTER — HOSPITAL ENCOUNTER (OUTPATIENT)
Dept: PHYSICAL THERAPY | Age: 35
Setting detail: THERAPIES SERIES
Discharge: HOME | End: 2020-02-25
Payer: COMMERCIAL

## 2020-02-25 PROCEDURE — 97035 APP MDLTY 1+ULTRASOUND EA 15: CPT

## 2020-02-25 PROCEDURE — 97140 MANUAL THERAPY 1/> REGIONS: CPT

## 2020-02-25 NOTE — FLOWSHEET NOTE
rot      Objective:   Observation:    Test measurements:      Exercises:  Exercise/Equipment Resistance/Repetitions Other comments        L UT str No stretch felt    L LS str Hep - done this morning    Cervical retraction    TB Row        Ext  2/4 issued blue TB    pec str  Performed 2/11 - hadn't done yet   Cervical isometrics  For hep         STM L UT/CPVM at C5-6, C6-7 With hand and then   IASTM to focus on knot lower L CPVM X 8 min total     Demo use of theracane x 3 min and issued info on where to purchase if intertested    % 1.5 w/cm2 to CPVM  X 8 min No tingling in L UE after STM/US         trial again manual mobs and cerv ROM since pt feeling better        trial mech c-tx with MHP        Trial manual c-tx           D/C all attempts             Stopped after 7 min due to pt c/o inc \"uncomfortable\" pain in L shoulder which stopped as soon as c-tx stopped     1/23: discomfort L shoulder afterwards. Consider fewer pulls next visit     States that pain increases in Supine   MHP after  15 min around neck seated       Feels better after HP   kiniseotape  Declined, feels good right now      Other Therapeutic Activities:  Pt was educated on PT POC, Diagnosis, Prognosis, pathomechanics as well as frequency and duration of scheduling future physical therapy appointments. Time was also taken on this day to answer all patient questions and participation in PT. Reviewed appointment policy in detail with patient and patient verbalized understanding. Home Exercise Program: Patient was instructed in the above for HEP: . Patient verbalized/demonstrated understanding and was issued written handout. Charges: Therapeutic Exercise:  [] (42379) Provided verbal/tactile cueing for activities to restore or maintain strength, flexibility, endurance, ROM for improvements with self-care, mobility, lifting and ambulation.     Neuromuscular Re-Education  [] (23910) Provided verbal/tactile cueing for activities to to R now without always getting tingling in L UE like he used to; however sleeping on L side is still difficult   Prognosis: [x] Good [] Fair  [] Poor    Goals:    Short term goals  Time Frame for Short term goals: 2 wks  Short term goal 1: pain overall 20-30% improved per pt   Short term goal 2: pt notes inc awareness of posure with work and [address] term goals  Time Frame for Long term goals : discharge  Long term goal 1: pain overall 50-60% improved per pt for ease with sleep and childcare   Long term goal 2: pt independent with hep   Long term goal 3: full ROM neck and L UE without discomfort      Patient Requires Follow-up: [x] Yes  [] No    Plan:   [x] Continue per plan of care [] Alter current plan (see comments)  [] Plan of care initiated [] Hold pending MD visit [] Discharge    Plan for Next Session:  Add above as stated; probable d/c after next session to cont with hep     Electronically signed by:  Clare Anthony, [id number] [address]

## 2020-02-27 ENCOUNTER — HOSPITAL ENCOUNTER (OUTPATIENT)
Dept: PHYSICAL THERAPY | Age: 35
Setting detail: THERAPIES SERIES
Discharge: HOME | End: 2020-02-27
Payer: COMMERCIAL

## 2020-02-27 PROCEDURE — 97035 APP MDLTY 1+ULTRASOUND EA 15: CPT

## 2020-02-27 PROCEDURE — 97140 MANUAL THERAPY 1/> REGIONS: CPT

## 2020-02-27 NOTE — FLOWSHEET NOTE
activities to restore or maintain balance, coordination, kinesthetic sense, posture, motor skill, proprioception for self-care, mobility, lifting, and ambulation. Therapeutic Activities:    [x] (07161) Provided verbal/tactile cueing to address functional limitations related to loss of mobility, strength, balance, and coordination. Gait Training:  [] (85642) Provided training and instruction to the patient for proper postural muscle recruitment and positioning with ambulation re-education     Home Exercise Program:    [x] (55712) Reviewed/Progressed HEP activities related to strengthening, flexibility, endurance, ROM for functional self-care, mobility, lifting and ambulation   [] (52095) Reviewed/Progressed HEP activities related to improving balance, coordination, kinesthetic sense, posture, motor skill, proprioception for self-care, mobility, lifting, and ambulation      Manual Treatments:  MFR / STM / Oscillations-Mobs:  G-I, II, III, IV / Manipulation / MLD  [x] (56392) Provided manual therapy to mobilize  soft tissue/joints/fluid for the purpose of modulating pain, promoting relaxation, increasing ROM, reducing/eliminating soft tissue swelling/inflammation/restriction, improving soft tissue extensibility and allowing for proper ROM for normal function with self- care, mobility, lifting and ambulation.       Timed Code Treatment Minutes: 25   Total Treatment Minutes: 40     [] EVAL (LOW) 42642   [] EVAL (MOD) 99179   [] EVAL (HIGH) 91796   [] RE-EVAL   [] TE (50494) x     [] Aquatic (78753) x  [] NMR (96234)   x  [] Aquatic Group (40866) x  [x] Manual (11849) x    [x] Ultrasound (63672) x  [] TA (02572) x1  [] Mech Traction (91758)  [] Ionto (28692)           [] ES (un) (22569):   [] Vasopump (33643) [] Other:      Assessment:  [x] Patient tolerated treatment well [] Patient limited by fatigue  [] Patient limited by pain  [] Patient limited by other medical complications  [x] Other:  Functional assessment:

## 2020-02-27 NOTE — DISCHARGE SUMMARY
Physical Therapy Discharge Note  Date: 2020    Patient Name: Alexy Portillo  : 1985  MRN: [id number]     Restrictions/Precautions: Position Activity Restriction  Other position/activity restrictions: no fall risk     Pertinent Medical History: Additional Pertinent Hx: asthma, PCN allergy      Medical/Treatment Diagnosis Information:  · Diagnosis: cervical radicular pain; degeneration C5-6 disc   · Treatment Diagnosis: neck pain with L UE radicular pain; dec ROM and strength; dec postural awareness      Insurance/Certification information:  PT Insurance Information: [employer]   Physician Information:  Referring Practitioner: Karina Ziegler   Plan of care signed (Y/N):  Sent to inbox     Visit# / total visits:    Pain level:      0/10       Dates of Service: 20-20  Total # of Visits:9  Cancel/No Shows to date:2  Medicare Cap Total for 2019:    Functional Outcomes Measures: at discharge  Test: NDI  Score:7    Treatment to Date:  [x] Therapeutic Exercise  [x] Modalities:  [x] Therapeutic Activity     [x] Ultrasound  [] Electrical Stim   [] Gait Training      [] Cervical Traction    [] Lumbar Traction  [] Neuromuscular Re-education  [x] Cold/hotpack [] Iontophoresis  [x] Instruction in HEP      Other:  [x] Manual Therapy       []    [] Aquatic Therapy       []                            Significant Findings At Last Visit:     Progress Note (20)  * PT is helping; relief after sessions lasts for the rest of the day  * overall feels 75-80% improved  * no longer having neck pain; only a tightness now and the uncomfortable sensation L upper arm  * NT in L bicep/deltoid is less and intermittent now, not constant; more of an uncomfortable pressure   * inc postural awareness with work and ADLS   * sleep is better, but still having some bad nights; laying on L shoulder is improving  * neck ROM WNL only movement that inc s/s is R rot, but that is getting better also  * able to play basketball with his kids over the weekend with some intermittent tingling in L UE, but it didn't last very long       Overall Response to Treatment:  [x] Patient responded well to treatment and improvement is noted with regards to functional goals              [x]Patient should continue to improve in reasonable time if they continue HEP              []Patient has plateaued and is no longer responding to skilled PT intervention                        []Patient is getting worse and would benefit from return to referring MD              []Patient unable to adhere to initial POC              []Other:       Goal Status:  [x] Achieved [x] Partially Achieved  [] Not Achieved     Reason for Discharge:  [x] Goals Met   [] Patient did not return after initial evaluation   [] Progress Plateaued [] Missed _____ scheduled appointments   [] No insurance coverage [] Patient terminated therapy   [x] Other:  Ready to cont with hep independently        PT recommendation:   [x] Patient now discharged with HEP      [x] Other: d/c with 30 day trial to [facility]     Discharge discussed with:  [x] Patient  [] Caregiver       [] Other        Electronically signed by:  Radha Nam, [id number] [address]    If you have any questions or concerns, please don't hesitate to call.   Thank you for your referral.

## 2021-04-30 ENCOUNTER — HOSPITAL ENCOUNTER (EMERGENCY)
Age: 36
Discharge: HOME | End: 2021-04-30
Attending: EMERGENCY MEDICINE
Payer: COMMERCIAL

## 2021-04-30 VITALS
SYSTOLIC BLOOD PRESSURE: 129 MMHG | RESPIRATION RATE: 17 BRPM | DIASTOLIC BLOOD PRESSURE: 79 MMHG | HEART RATE: 84 BPM | OXYGEN SATURATION: 98 % | TEMPERATURE: 97.8 F

## 2021-04-30 DIAGNOSIS — L02.214 ABSCESS OF GROIN, RIGHT: Primary | ICD-10-CM

## 2021-04-30 PROCEDURE — 99283 EMERGENCY DEPT VISIT LOW MDM: CPT

## 2021-04-30 PROCEDURE — 6370000000 HC RX 637 (ALT 250 FOR IP): Performed by: EMERGENCY MEDICINE

## 2021-04-30 PROCEDURE — 10061 I&D ABSCESS COMP/MULTIPLE: CPT

## 2021-04-30 PROCEDURE — 2500000003 HC RX 250 WO HCPCS: Performed by: EMERGENCY MEDICINE

## 2021-04-30 RX ORDER — DOXYCYCLINE HYCLATE 100 MG
100 TABLET ORAL ONCE
Status: COMPLETED | OUTPATIENT
Start: 2021-04-30 | End: 2021-04-30

## 2021-04-30 RX ORDER — LIDOCAINE HYDROCHLORIDE 10 MG/ML
5 INJECTION, SOLUTION INFILTRATION; PERINEURAL ONCE
Status: COMPLETED | OUTPATIENT
Start: 2021-04-30 | End: 2021-04-30

## 2021-04-30 RX ORDER — DOXYCYCLINE HYCLATE 100 MG/1
100 CAPSULE ORAL 2 TIMES DAILY
Qty: 14 CAPSULE | Refills: 0 | Status: SHIPPED | OUTPATIENT
Start: 2021-04-30 | End: 2021-05-07

## 2021-04-30 RX ADMIN — LIDOCAINE HYDROCHLORIDE 5 ML: 10 INJECTION, SOLUTION INFILTRATION; PERINEURAL at 12:44

## 2021-04-30 RX ADMIN — DOXYCYCLINE HYCLATE 100 MG: 100 TABLET, COATED ORAL at 12:51

## 2021-04-30 ASSESSMENT — PAIN SCALES - GENERAL: PAINLEVEL_OUTOF10: 7

## 2021-04-30 NOTE — ED PROVIDER NOTES
CHIEF COMPLAINT  Abscess (patient states that he noticied a \"bump\" on his right upper thigh 2 days ago. pt states \"I thought it was just a pus pocket so I popped it\". pt. states that yesterday it got more swollen and painful. )      HISTORY OF PRESENT ILLNESS  Cosmo Crook is a 38 y.o. male who  has a past medical history of Asthma. presents to the ED complaining of possible abscess on his right inguinal region. Patient states that he thought he may have had a ingrown hair and noticed a small pustule 2 days ago. States that he did squeeze the pustule and a small amount of fluid came out. States that over the past 2 days that the area is gotten more swollen and painful. States there is some redness in the area. Denies any testicular pain or swelling. Denies any active drainage from the area of swelling. No fevers or chills. No nausea or vomiting. No other complaints, modifying factors or associated symptoms. Nursing notes reviewed.    Past Medical History:   Diagnosis Date    Asthma      Denies any past surgical history  Denies any pertinent family history  Social History     Socioeconomic History    Marital status: Single     Spouse name: Not on file    Number of children: Not on file    Years of education: Not on file    Highest education level: Not on file   Occupational History    Not on file   Social Needs    Financial resource strain: Not on file    Food insecurity     Worry: Not on file     Inability: Not on file    Transportation needs     Medical: Not on file     Non-medical: Not on file   Tobacco Use    Smoking status: Former Smoker     Packs/day: 0.50     Types: Cigarettes     Quit date: 1/4/2018     Years since quitting: 3.3    Smokeless tobacco: Never Used   Substance and Sexual Activity    Alcohol use: Not Currently    Drug use: Never    Sexual activity: Not on file   Lifestyle    Physical activity     Days per week: Not on file     Minutes per session: Not on file    Stress: Not on file   Relationships    Social connections     Talks on phone: Not on file     Gets together: Not on file     Attends Oriental orthodox service: Not on file     Active member of club or organization: Not on file     Attends meetings of clubs or organizations: Not on file     Relationship status: Not on file    Intimate partner violence     Fear of current or ex partner: Not on file     Emotionally abused: Not on file     Physically abused: Not on file     Forced sexual activity: Not on file   Other Topics Concern    Not on file   Social History Narrative    Not on file     No current facility-administered medications for this encounter. Current Outpatient Medications   Medication Sig Dispense Refill    doxycycline hyclate (VIBRAMYCIN) 100 MG capsule Take 1 capsule by mouth 2 times daily for 7 days 14 capsule 0    methylPREDNISolone (MEDROL, VALENTINA,) 4 MG tablet Take by mouth.  6 po day one 5 po day 2 4 po day 3 3 po day 4 2 po day 5 1 po day 6. 1 kit 0    naproxen (NAPROSYN) 500 MG tablet Take 1 tablet by mouth 2 times daily 60 tablet 0    acetaminophen (TYLENOL) 500 MG tablet Take 2 tablets by mouth 4 times daily as needed for Pain 60 tablet 0    albuterol sulfate HFA (PROAIR HFA) 108 (90 Base) MCG/ACT inhaler Inhale 2 puffs into the lungs every 6 hours as needed for Wheezing 1 Inhaler 3     No Active Allergies      REVIEW OF SYSTEMS  10 systems reviewed, pertinent positives per HPI otherwise noted to be negative    PHYSICAL EXAM  /82   Pulse 86   Temp 98.4 °F (36.9 °C) (Temporal)   Resp 18   SpO2 97%      CONSTITUTIONAL: AOx4, cooperative with exam, afebrile   HEAD: normocephalic, atraumatic   EYES: PERRL, EOMI, anicteric sclera   ENT: Moist mucous membranes, uvula midline   LUNGS: Bilateral breath sounds, CTAB, no rales/ronchi/wheezes   CARDIOVASCULAR: RRR, normal S1/S2, no m/r/g, 2+ pulses throughout   ABDOMEN: Soft, non-tender, non-distended, +BS   NEUROLOGIC:  MAEx4, GCS 15 MUSCULOSKELETAL: No clubbing, cyanosis or edema   SKIN: No rash, 1 cm x 2 cm area of induration, swelling and fluctuance in the right inguinal crease, no active drainage, mild tenderness         RADIOLOGY  X-RAYS:  I have reviewed radiologic plain film image(s). ALL OTHER NON-PLAIN FILM IMAGES SUCH AS CT, ULTRASOUND AND MRI HAVE BEEN READ BY THE RADIOLOGIST. No orders to display          EKG INTERPRETATION  None    PROCEDURES  Incision/Drainage    Date/Time: 4/30/2021 1:17 PM  Performed by: Parminder Silveira MD  Authorized by: Parminder Silveira MD     Consent:     Consent obtained:  Verbal    Consent given by:  Patient    Risks discussed:  Bleeding, damage to other organs, infection, incomplete drainage and pain    Alternatives discussed:  No treatment, delayed treatment and observation  Location:     Type:  Abscess    Size:  1 cm x 2 cm    Location:  Anogenital    Anogenital location: right inguinal crease. Pre-procedure details:     Skin preparation:  Chloraprep  Anesthesia (see MAR for exact dosages): Anesthesia method:  Local infiltration    Local anesthetic:  Lidocaine 1% w/o epi  Procedure type:     Complexity:  Simple  Procedure details:     Incision types:  Stab incision    Incision depth:  Dermal    Scalpel blade:  11    Wound management:  Probed and deloculated and irrigated with saline    Drainage:  Bloody    Drainage amount: Moderate    Wound treatment:  Wound left open    Packing materials:  None  Post-procedure details:     Patient tolerance of procedure: Tolerated well, no immediate complications          ED COURSE/MDM  Abscess, folliculitis  Patient seen and evaluated. History and physical as above. Nontoxic, afebrile. Patient presents with abscess in the right inguinal crease. Abscess was drained. Please see procedure note above. Patient tolerated well. Started on doxycycline for antibiotic coverage. Plan for discharge with outpatient follow-up. PCP referral provided to discharge.   All questions answered prior to discharge. Patient agreeable care plan. I estimate there is LOW risk for CELLULITIS, COMPARTMENT SYNDROME, NECROTIZING FASCIITIS, TENDON OR NEUROVASCULAR INJURY, or FOREIGN BODY, thus I consider the discharge disposition reasonable. Also, there is no evidence or peritonitis, sepsis, or toxicity. Alli Hopson and I have discussed the diagnosis and risks, and we agree with discharging home to follow-up with their primary doctor. We also discussed returning to the Emergency Department immediately if new or worsening symptoms occur. We have discussed the symptoms which are most concerning (e.g., changing or worsening pain, fever, numbness, weakness, cool or painful digits) that necessitate immediate return. Patient was given scripts for the following medications. I counseled patient how to take these medications. New Prescriptions    DOXYCYCLINE HYCLATE (VIBRAMYCIN) 100 MG CAPSULE    Take 1 capsule by mouth 2 times daily for 7 days           CLINICAL IMPRESSION  1. Abscess of groin, right        Blood pressure 134/82, pulse 86, temperature 98.4 °F (36.9 °C), temperature source Temporal, resp. rate 18, SpO2 97 %. DISPOSITION  Patient was discharged to home in good condition. [facility]  [phone]  Call today  For a follow up appointment. Disclaimer: All medical record entries made by [address] [place] [address] [address] [facility] [place].       (Please note that this note was completed with a voice recognition program. Every attempt was made to edit the dictations, but inevitably there remain words that are mis-transcribed.)            Socorro Montano MD  04/30/21 [id number]

## 2021-04-30 NOTE — ED TRIAGE NOTES
Pt to ED with abscess to right upper thigh. Pt states 2 days ago there was drainage. No drainage at present.

## 2022-01-05 ENCOUNTER — HOSPITAL ENCOUNTER (EMERGENCY)
Age: 37
Discharge: LEFT BEFORE DISCHARGE | End: 2022-01-05

## 2022-01-05 ENCOUNTER — HOSPITAL ENCOUNTER (EMERGENCY)
Age: 37
Discharge: HOME | DRG: 710 | End: 2022-01-05
Payer: COMMERCIAL

## 2022-01-05 VITALS
HEIGHT: 65 IN | SYSTOLIC BLOOD PRESSURE: 144 MMHG | OXYGEN SATURATION: 96 % | BODY MASS INDEX: 20.94 KG/M2 | WEIGHT: 125.66 LBS | RESPIRATION RATE: 14 BRPM | DIASTOLIC BLOOD PRESSURE: 97 MMHG | TEMPERATURE: 99.9 F | HEART RATE: 90 BPM

## 2022-01-05 VITALS
BODY MASS INDEX: 21.34 KG/M2 | OXYGEN SATURATION: 97 % | HEART RATE: 94 BPM | TEMPERATURE: 100.4 F | SYSTOLIC BLOOD PRESSURE: 131 MMHG | DIASTOLIC BLOOD PRESSURE: 80 MMHG | WEIGHT: 124.34 LBS | RESPIRATION RATE: 18 BRPM

## 2022-01-05 DIAGNOSIS — L03.317 CELLULITIS OF BUTTOCK: Primary | ICD-10-CM

## 2022-01-05 PROCEDURE — 99284 EMERGENCY DEPT VISIT MOD MDM: CPT

## 2022-01-05 PROCEDURE — 6370000000 HC RX 637 (ALT 250 FOR IP)

## 2022-01-05 RX ORDER — CEPHALEXIN 500 MG/1
500 CAPSULE ORAL 4 TIMES DAILY
Qty: 40 CAPSULE | Refills: 0 | Status: ON HOLD | OUTPATIENT
Start: 2022-01-05 | End: 2022-01-10 | Stop reason: HOSPADM

## 2022-01-05 RX ORDER — SULFAMETHOXAZOLE AND TRIMETHOPRIM 800; 160 MG/1; MG/1
1 TABLET ORAL 2 TIMES DAILY
Qty: 20 TABLET | Refills: 0 | Status: SHIPPED | OUTPATIENT
Start: 2022-01-05 | End: 2022-01-05 | Stop reason: SDUPTHER

## 2022-01-05 RX ORDER — HYDROCODONE BITARTRATE AND ACETAMINOPHEN 5; 325 MG/1; MG/1
1 TABLET ORAL ONCE
Status: COMPLETED | OUTPATIENT
Start: 2022-01-05 | End: 2022-01-05

## 2022-01-05 RX ORDER — HYDROCODONE BITARTRATE AND ACETAMINOPHEN 5; 325 MG/1; MG/1
1 TABLET ORAL EVERY 4 HOURS PRN
Qty: 18 TABLET | Refills: 0 | Status: SHIPPED | OUTPATIENT
Start: 2022-01-05 | End: 2022-01-07

## 2022-01-05 RX ORDER — CEPHALEXIN 500 MG/1
500 CAPSULE ORAL 4 TIMES DAILY
Qty: 40 CAPSULE | Refills: 0 | Status: SHIPPED | OUTPATIENT
Start: 2022-01-05 | End: 2022-01-05 | Stop reason: SDUPTHER

## 2022-01-05 RX ORDER — SULFAMETHOXAZOLE AND TRIMETHOPRIM 800; 160 MG/1; MG/1
1 TABLET ORAL 2 TIMES DAILY
Qty: 20 TABLET | Refills: 0 | Status: ON HOLD | OUTPATIENT
Start: 2022-01-05 | End: 2022-01-10 | Stop reason: HOSPADM

## 2022-01-05 RX ADMIN — HYDROCODONE BITARTRATE AND ACETAMINOPHEN 1 TABLET: 5; 325 TABLET ORAL at 18:47

## 2022-01-05 ASSESSMENT — PAIN DESCRIPTION - FREQUENCY: FREQUENCY: CONTINUOUS

## 2022-01-05 ASSESSMENT — PAIN SCALES - GENERAL
PAINLEVEL_OUTOF10: 8
PAINLEVEL_OUTOF10: 8
PAINLEVEL_OUTOF10: 9

## 2022-01-05 ASSESSMENT — PAIN DESCRIPTION - LOCATION
LOCATION: BUTTOCKS
LOCATION: RECTUM

## 2022-01-05 ASSESSMENT — PAIN DESCRIPTION - DESCRIPTORS: DESCRIPTORS: DISCOMFORT

## 2022-01-05 ASSESSMENT — PAIN DESCRIPTION - PAIN TYPE
TYPE: ACUTE PAIN
TYPE: ACUTE PAIN

## 2022-01-05 ASSESSMENT — PAIN DESCRIPTION - PROGRESSION: CLINICAL_PROGRESSION: GRADUALLY WORSENING

## 2022-01-05 ASSESSMENT — PAIN DESCRIPTION - ONSET: ONSET: PROGRESSIVE

## 2022-01-05 NOTE — ED PROVIDER NOTES
[facility] ENCOUNTER        Pt Name: Ronnie Galan  MRN: [id number]  [place] 1985  Date of evaluation: 1/5/2022  Provider: MELISSA Talavera CNP  PCP: No primary care provider on file. Note Started: 6:43 PM EST      SAROJ. I have evaluated this patient. My supervising physician was available for consultation. Triage CHIEF COMPLAINT       Chief Complaint   Patient presents with    Hemorrhoids     exacerbated x4days         HISTORY OF PRESENT ILLNESS   (Location/Symptom, Timing/Onset, Context/Setting, Quality, Duration, Modifying Factors, Severity)  Note limiting factors. Chief Complaint: Hemorrhoids    Ronnie Galan is a 39 y.o. male who presents to the emergency department complaining of hemorrhoid pain x4 days. He says all of these symptoms started on Saturday, and he has been constipated intermittently since then. He has tried a \"cleanout Tea\" with good symptom relief constipation. However, he has not had symptom relief for his hemorrhoid with ibuprofen, Preparation H, and sitz bath's. Pain is located ~1cm superior to anus, TTP. Pain does not radiate. Nursing Notes were all reviewed and agreed with or any disagreements were addressed in the HPI. REVIEW OF SYSTEMS    (2-9 systems for level 4, 10 or more for level 5)     Review of Systems   Constitutional: Negative for chills, diaphoresis and fever. HENT: Negative for congestion, rhinorrhea and sore throat. Eyes: Negative for pain and visual disturbance. Respiratory: Negative for cough and shortness of breath. Cardiovascular: Negative for chest pain and leg swelling. Gastrointestinal: Positive for constipation and rectal pain. Negative for abdominal distention, abdominal pain, anal bleeding, blood in stool, diarrhea, nausea and vomiting.    Genitourinary: Negative for difficulty urinating, frequency, genital sores, hematuria, penile discharge, penile pain, penile Active Member of Clubs or Organizations: Not on file    Attends Club or Organization Meetings: Not on file    Marital Status: Not on file   Intimate Partner Violence:     Fear of Current or Ex-Partner: Not on file    Emotionally Abused: Not on file    Physically Abused: Not on file    Sexually Abused: Not on file   Housing Stability:     Unable to Pay for Housing in the Last Year: Not on file    Number of [place] in the Last Year: Not on file    Unstable Housing in the Last Year: Not on file       SCREENINGS             PHYSICAL EXAM    (up to 7 for level 4, 8 or more for level 5)     ED Triage Vitals [01/05/22 1715]   BP Temp Temp Source Pulse Resp SpO2 Height Weight   (!) 144/97 99.9 °F (37.7 °C) Oral 90 14 96 % 5' 5\" (1.651 m) 125 lb 10.6 oz (57 kg)       Physical Exam  Vitals and nursing note reviewed. Exam conducted with a chaperone present. Constitutional:       Appearance: Normal appearance. He is not ill-appearing or diaphoretic. Comments: Appears to be in pain   HENT:      Head: Normocephalic and atraumatic. Nose: Nose normal.      Mouth/Throat:      Mouth: Mucous membranes are moist.      Pharynx: Oropharynx is clear. No oropharyngeal exudate or posterior oropharyngeal erythema. Eyes:      General: No scleral icterus. Right eye: No discharge. Left eye: No discharge. Conjunctiva/sclera: Conjunctivae normal.   Cardiovascular:      Rate and Rhythm: Normal rate and regular rhythm. Pulses: Normal pulses. Heart sounds: Normal heart sounds. No murmur heard. Pulmonary:      Effort: Pulmonary effort is normal. No respiratory distress. Breath sounds: Normal breath sounds. No stridor. No wheezing or rales. Abdominal:      General: Abdomen is flat. Bowel sounds are normal. There is no distension. Palpations: Abdomen is soft. There is no mass. Tenderness: There is no abdominal tenderness.  There is no right CVA tenderness, left CVA tenderness or guarding. Hernia: No hernia is present. Genitourinary:     Penis: Circumcised. Rectum: Tenderness present. No mass, external hemorrhoid or internal hemorrhoid. Normal anal tone. Comments: No external Hemorid. Rectal exam performed by SHELLIE Daniel and no internal hem, no blood on glove after exam. Per her no area of fluctuance internally. Area A in fig is inflamed, erythematous, TTP, indurated ~0.5cm diameter. Appears to be surrounding a hair follicle. Musculoskeletal:         General: No deformity. Normal range of motion. Cervical back: Normal range of motion and neck supple. No rigidity. Skin:     General: Skin is warm and dry. Capillary Refill: Capillary refill takes less than 2 seconds. Coloration: Skin is not jaundiced. Findings: No rash. Neurological:      General: No focal deficit present. Mental Status: He is alert and oriented to person, place, and time. Psychiatric:         Mood and Affect: Mood normal.         Behavior: Behavior normal.         DIAGNOSTIC RESULTS   LABS:    Labs Reviewed - No data to display    When ordered, only abnormal lab results are displayed. All other labs were within normal range or not returned as of this dictation. EKG: When ordered, EKG's are interpreted by the Emergency Department Physician in the absence of a cardiologist.  Please see their note for interpretation of EKG. RADIOLOGY:   Non-plain film images such as CT, Ultrasound and MRI are read by the radiologist. Plain radiographic images are visualized andpreliminarily interpreted by the  ED Provider with the below findings:        Interpretation perthe Radiologist below, if available at the time of this note:    No orders to display     No results found.       PROCEDURES   Unless otherwise noted below, none     Procedures    CRITICAL CARE TIME   N/A    CONSULTS:  None      EMERGENCY DEPARTMENT COURSE and DIFFERENTIAL DIAGNOSIS/MDM:   Vitals: Vitals:    01/05/22 1715   BP: (!) 144/97   Pulse: 90   Resp: 14   Temp: 99.9 °F (37.7 °C)   TempSrc: Oral   SpO2: 96%   Weight: 125 lb 10.6 oz (57 kg)   Height: 5' 5\" (1.651 m)       Patient was given thefollowing medications:  Medications   HYDROcodone-acetaminophen (NORCO) 5-325 MG per tablet 1 tablet (1 tablet Oral Given 1/5/22 1847)         The patient is an otherwise well-appearing 19-year-old male with a history of hemorrhoids who presents to the ER complaining of hemmoroid pain. Exam was remarkable for no hemmoroid, but revealed an area of induration concerning. Differential includes but is not limited to  Marielena's, cellulitis, abscess, folliculitis. PE with low grade fever, not tacycardic, good rectal tone, absence of blood on GIGI, no palpable internal bogginess for spreading of abscess, lack of obvious fluctuance externally, no streaking redness,and no palpable lymph nodes inguinally. Pt denies comorbid conditions like being a diabetic. He has not been incontinent and has no saddle anesthesia. After discussing risks and benefits with patient, given his pain, needle aspiration of abscess was attempted. The area was prepped in a sterile fashion, a 3 cc syringe with an 18-gauge needle was inserted superficially with negative pressure applied by pulling back. I was unable to aspirate any contents. Hemostasis was achieved and there was less than 1 cc blood loss. Pressure was applied with gauze to achieve hemostasis. Given the patient's significant pain with treated course of narcotics is reasonable. Precautions were discussed with the patient. Will also cover skin infection with antibiotics and those options were also given to the patient. Discharge instructions, return precautions, and follow-up instructions were discussed with the patient. The patient is agreeable with this plan. Patient was given an opportunity to ask questions, and their questions have been answered.   The patient is appropriate to be discharged home at this point       FINAL IMPRESSION      1. Cellulitis of buttock          DISPOSITION/PLAN   DISPOSITION Decision To Discharge 01/05/2022 06:49:46 PM      PATIENT REFERREDTO:  No follow-up provider specified. DISCHARGE MEDICATIONS:  Discharge Medication List as of 1/5/2022  6:44 PM      START taking these medications    Details   HYDROcodone-acetaminophen (NORCO) 5-325 MG per tablet Take 1 tablet by mouth every 4 hours as needed for Pain for up to 3 days. Intended supply: 3 days.  Take lowest dose possible to manage pain, Disp-18 tablet, R-0Print      sulfamethoxazole-trimethoprim (BACTRIM DS) 800-160 MG per tablet Take 1 tablet by mouth 2 times daily for 10 days, Disp-20 tablet, R-0Normal      cephALEXin (KEFLEX) 500 MG capsule Take 1 capsule by mouth 4 times daily for 10 days, Disp-40 capsule, R-0Normal             DISCONTINUED MEDICATIONS:  Discharge Medication List as of 1/5/2022  6:44 PM      STOP taking these medications       methylPREDNISolone (MEDROL, VALENTINA,) 4 MG tablet Comments:   Reason for Stopping:         naproxen (NAPROSYN) 500 MG tablet Comments:   Reason for Stopping:         acetaminophen (TYLENOL) 500 MG tablet Comments:   Reason for Stopping:         albuterol sulfate HFA (PROAIR HFA) 108 (90 Base) MCG/ACT inhaler Comments:   Reason for Stopping:                      (Please note that portions ofthis note were completed with a voice recognition program.  Efforts were made to edit the dictations but occasionally words are mis-transcribed.)    MELISSA Lara CNP (electronically signed)             MELISSA Lara CNP  01/07/22 [id number]

## 2022-01-05 NOTE — ED TRIAGE NOTES
Pt presents to ED ambulatory with c/o of hemorrhoids worsening x4 days. Resp even and unlabored. A/ox4. Call light within reach. Bed in lowest position. Will continue to monitor.

## 2022-01-06 ENCOUNTER — HOSPITAL ENCOUNTER (EMERGENCY)
Age: 37
Discharge: HOME | DRG: 710 | End: 2022-01-06
Attending: EMERGENCY MEDICINE
Payer: COMMERCIAL

## 2022-01-06 VITALS
RESPIRATION RATE: 14 BRPM | SYSTOLIC BLOOD PRESSURE: 121 MMHG | WEIGHT: 122.69 LBS | TEMPERATURE: 99.3 F | BODY MASS INDEX: 20.95 KG/M2 | HEART RATE: 96 BPM | HEIGHT: 64 IN | OXYGEN SATURATION: 95 % | DIASTOLIC BLOOD PRESSURE: 70 MMHG

## 2022-01-06 DIAGNOSIS — L03.317 CELLULITIS OF BUTTOCK: Primary | ICD-10-CM

## 2022-01-06 PROCEDURE — 6370000000 HC RX 637 (ALT 250 FOR IP): Performed by: PHYSICIAN ASSISTANT

## 2022-01-06 PROCEDURE — 99285 EMERGENCY DEPT VISIT HI MDM: CPT

## 2022-01-06 RX ORDER — OXYCODONE HYDROCHLORIDE 5 MG/1
5 TABLET ORAL ONCE
Status: COMPLETED | OUTPATIENT
Start: 2022-01-06 | End: 2022-01-06

## 2022-01-06 RX ORDER — OXYCODONE AND ACETAMINOPHEN 10; 325 MG/1; MG/1
1 TABLET ORAL ONCE
Status: DISCONTINUED | OUTPATIENT
Start: 2022-01-06 | End: 2022-01-06

## 2022-01-06 RX ORDER — OXYCODONE HYDROCHLORIDE AND ACETAMINOPHEN 5; 325 MG/1; MG/1
1 TABLET ORAL ONCE
Status: COMPLETED | OUTPATIENT
Start: 2022-01-06 | End: 2022-01-06

## 2022-01-06 RX ORDER — OXYCODONE HYDROCHLORIDE AND ACETAMINOPHEN 5; 325 MG/1; MG/1
1 TABLET ORAL EVERY 6 HOURS PRN
Qty: 10 TABLET | Refills: 0 | Status: ON HOLD | OUTPATIENT
Start: 2022-01-06 | End: 2022-01-10 | Stop reason: HOSPADM

## 2022-01-06 RX ADMIN — OXYCODONE AND ACETAMINOPHEN 1 TABLET: 5; 325 TABLET ORAL at 17:48

## 2022-01-06 RX ADMIN — OXYCODONE 5 MG: 5 TABLET ORAL at 17:48

## 2022-01-06 ASSESSMENT — PAIN SCALES - GENERAL
PAINLEVEL_OUTOF10: 6
PAINLEVEL_OUTOF10: 4
PAINLEVEL_OUTOF10: 4
PAINLEVEL_OUTOF10: 6

## 2022-01-06 ASSESSMENT — PAIN DESCRIPTION - FREQUENCY: FREQUENCY: CONTINUOUS

## 2022-01-06 ASSESSMENT — PAIN DESCRIPTION - PAIN TYPE
TYPE: ACUTE PAIN
TYPE: ACUTE PAIN

## 2022-01-06 ASSESSMENT — PAIN DESCRIPTION - DESCRIPTORS: DESCRIPTORS: TENDER;SORE

## 2022-01-06 ASSESSMENT — PAIN DESCRIPTION - PROGRESSION: CLINICAL_PROGRESSION: GRADUALLY WORSENING

## 2022-01-06 ASSESSMENT — PAIN DESCRIPTION - ONSET: ONSET: ON-GOING

## 2022-01-06 ASSESSMENT — PAIN DESCRIPTION - LOCATION: LOCATION: BUTTOCKS

## 2022-01-06 NOTE — ED NOTES
Awake alert  Pain very bad having trouble with activities of daily living     Mavis Galindo RN  01/06/22 [id number]

## 2022-01-06 NOTE — ED NOTES
dc'd to home  Awake alert  resp easy and unlabored  4x4 placed to area  To continue on antibiotics as directed     Rinda Kirks, RN  01/06/22 1663

## 2022-01-06 NOTE — ED PROVIDER NOTES
[address] [facility] [place]        Pt Name: Lindsay Meza  MRN: [id number]  [place] 1985  Date of evaluation: 1/6/2022  Provider: SHELLIE Mcdaniels  PCP: No primary care provider on file. Note Started: 6:44 PM EST     The ED Attending Physician was available for consultation but did not see or evaluate this patient. CHIEF COMPLAINT       Chief Complaint   Patient presents with    Abscess     was here through the night  Not I and d' but she did insert needle and only had a blood return per patient  now he states pain getting worse  started meds this am       HISTORY OF PRESENT ILLNESS   (Location, Timing/Onset, Context/Setting, Quality, Duration, Modifying Factors, Severity, Associated Signs and Symptoms)  Note limiting factors. Chief Complaint: Pain and swelling in the buttock/perirectal area    Lindsay Meza is a 39 y.o. male who presents with the above complaint, reportedly worsened since yesterday, he was seen in this emergency department and diagnosed with cellulitis of the buttocks. He says incision and drainage was performed at that time but no pus was removed. He was prescribed antibiotics and pain medication but he says the pain medication is not really helping, and he just began taking the antibiotics this morning. He says the swelling seems to be getting worse and he think it needs drained now. Says he is still able to have bowel movements without problems. Has a past history of external hemorrhoids. Denies any related surgery. He denies nausea or vomiting. Denies any related trauma. Nursing Notes were all reviewed and agreed with or any disagreements were addressed in the HPI. REVIEW OF SYSTEMS    (2-9 systems for level 4, 10 or more for level 5)     Review of Systems    Positives and pertinent negatives as per HPI.      PAST MEDICAL HISTORY     Past Medical History:   Diagnosis Date    Acute hemorrhoid     Asthma SURGICAL HISTORY   History reviewed. No pertinent surgical history. CURRENTMEDICATIONS       Previous Medications    CEPHALEXIN (KEFLEX) 500 MG CAPSULE    Take 1 capsule by mouth 4 times daily for 10 days    HYDROCODONE-ACETAMINOPHEN (NORCO) 5-325 MG PER TABLET    Take 1 tablet by mouth every 4 hours as needed for Pain for up to 3 days. Intended supply: 3 days. Take lowest dose possible to manage pain    SULFAMETHOXAZOLE-TRIMETHOPRIM (BACTRIM DS) 800-160 MG PER TABLET    Take 1 tablet by mouth 2 times daily for 10 days       ALLERGIES     Patient has no known allergies. FAMILYHISTORY     History reviewed. No pertinent family history. SOCIAL HISTORY       Social History     Tobacco Use    Smoking status: Current Every Day Smoker     Packs/day: 0.50     Types: Cigarettes, Cigars     Last attempt to quit: 2018     Years since quittin.0    Smokeless tobacco: Never Used   Substance Use Topics    Alcohol use: Not Currently    Drug use: Never       SCREENINGS           PHYSICAL EXAM    (up to 7 for level 4, 8 or more for level 5)     ED Triage Vitals [22 1529]   BP Temp Temp Source Pulse Resp SpO2 Height Weight   (!) 136/91 100 °F (37.8 °C) Oral 96 18 96 % 5' 4\" (1.626 m) 122 lb 11 oz (55.7 kg)       Physical Exam  Vitals and nursing note reviewed. Constitutional:       General: He is not in acute distress. Appearance: Normal appearance. He is not ill-appearing. HENT:      Head: Normocephalic and atraumatic. Nose: Nose normal.   Eyes:      General:         Right eye: No discharge. Left eye: No discharge. Pulmonary:      Effort: Pulmonary effort is normal. No respiratory distress. Abdominal:      General: Bowel sounds are normal. There is no distension. Palpations: Abdomen is soft. There is no mass. Tenderness: There is no abdominal tenderness. There is no guarding or rebound. Genitourinary:         Comments:  There is mild swelling, tenderness and fluctuance noted to small area just posterior and right lateral to the anal orifice, negative for discharge. Normal anal sphincter tone. Musculoskeletal:         General: Normal range of motion. Cervical back: Normal range of motion. Skin:     General: Skin is warm and dry. Neurological:      General: No focal deficit present. Mental Status: He is alert and oriented to person, place, and time. Psychiatric:         Mood and Affect: Mood normal.         Behavior: Behavior normal.         DIAGNOSTIC RESULTS   LABS:    Labs Reviewed - No data to display    When ordered only abnormal lab results are displayed. All other labs were within normal range or not returned as of this dictation. EKG: When ordered, EKG's are interpreted by the Emergency Department Physician in the absence of a cardiologist.  Please see their note for interpretation of EKG. RADIOLOGY:   Non-plain film images such as CT, Ultrasound and MRI are read by the radiologist. Plain radiographic images are visualized and preliminarily interpreted by the ED Provider with the below findings:    Interpretation per the Radiologist below, if available at the time of this note:    No orders to display       CONSULTS:  None    PROCEDURES   Unless otherwise noted below, none.      Procedures    EMERGENCY DEPARTMENT COURSE and DIFFERENTIAL DIAGNOSIS/MDM:   Vitals:    Vitals:    01/06/22 1529 01/06/22 1602   BP: (!) 136/91    Pulse: 96    Resp: 18 14   Temp: 100 °F (37.8 °C) 99.8 °F (37.7 °C)   TempSrc: Oral Oral   SpO2: 96%    Weight: 122 lb 11 oz (55.7 kg)    Height: 5' 4\" (1.626 m)        Patient was given the following medications:  Medications   oxyCODONE-acetaminophen (PERCOCET) 5-325 MG per tablet 1 tablet (1 tablet Oral Given 1/6/22 1748)     And   oxyCODONE (ROXICODONE) immediate release tablet 5 mg (5 mg Oral Given 1/6/22 1748)           Patient had normal vital signs, and physical exam suggested possible infection, although not likely a significant abscess. Patient wished for another attempted incision and drainage, which was done in the ED, but no pus was evacuated. No indication for CT imaging at this time, and patient is not septic. Just began taking antibiotics this morning. He was advised to continue his antibiotic therapy and given return precautions. Will prescribe stronger pain medication and advised not to take it along with his prior pain medication. The patient verbalized understanding and agreement with this plan of care. The patient was advised to return to the emergency department if symptoms should significantly worsen or if new and concerning symptoms should appear. I estimate there is LOW risk for SEVERE CELLULITIS, SEPSIS or NECROTIZING FASCIITIS, thus I consider the discharge disposition reasonable. CRITICAL CARE TIME   None    FINAL IMPRESSION      1. Cellulitis of buttock          DISPOSITION/PLAN   DISPOSITION Decision To Discharge 01/06/2022 06:23:51 PM      PATIENT REFERRED TO:  No follow-up provider specified. DISCHARGE MEDICATIONS:  New Prescriptions    OXYCODONE-ACETAMINOPHEN (PERCOCET) 5-325 MG PER TABLET    Take 1 tablet by mouth every 6 hours as needed for Pain for up to 3 days. TAKE IN PLACE OF THE HYDROCODONE-ACETAMINOPHEN (NORCO) MEDICATION YOU WERE PREVIOUSLY PRESCRIBED. DO NOT TAKE BOTH TOGETHER.        DISCONTINUED MEDICATIONS:  Discontinued Medications    No medications on file            (Please note that portions of this note were completed with a voice recognition program.  Efforts were made to edit the dictations but occasionally words are mis-transcribed.)    SHELLIE Early (electronically signed)       Laura Love, [address]  01/06/22 4859

## 2022-01-06 NOTE — ED NOTES
To return for worsening or changes  Walked out with ease  Skin warm and dry  To stay off sore area  To return increase pain or  Fever  Girlfriend here to pick him up  Aware no pain meds until after midnight and no alcohol     Debi Freeman RN  01/06/22 5648

## 2022-01-07 ENCOUNTER — APPOINTMENT (OUTPATIENT)
Dept: CT IMAGING | Age: 37
DRG: 710 | End: 2022-01-07
Payer: COMMERCIAL

## 2022-01-07 ENCOUNTER — HOSPITAL ENCOUNTER (INPATIENT)
Age: 37
LOS: 2 days | Discharge: HOME | DRG: 710 | End: 2022-01-10
Attending: EMERGENCY MEDICINE | Admitting: SURGERY
Payer: COMMERCIAL

## 2022-01-07 DIAGNOSIS — K61.1 PERIRECTAL ABSCESS: Primary | ICD-10-CM

## 2022-01-07 DIAGNOSIS — N17.9 AKI (ACUTE KIDNEY INJURY) (HCC): ICD-10-CM

## 2022-01-07 DIAGNOSIS — A41.9 SEPTICEMIA (HCC): ICD-10-CM

## 2022-01-07 DIAGNOSIS — Z78.9 FAILURE OF OUTPATIENT TREATMENT: ICD-10-CM

## 2022-01-07 LAB
A/G RATIO: 1.3 (ref 1.1–2.2)
ALBUMIN SERPL-MCNC: 4.1 G/DL (ref 3.4–5)
ALP BLD-CCNC: 73 U/L (ref 40–129)
ALT SERPL-CCNC: 11 U/L (ref 10–40)
ANION GAP SERPL CALCULATED.3IONS-SCNC: 15 MMOL/L (ref 3–16)
AST SERPL-CCNC: 15 U/L (ref 15–37)
BASOPHILS ABSOLUTE: 0.1 K/UL (ref 0–0.2)
BASOPHILS RELATIVE PERCENT: 0.6 %
BILIRUB SERPL-MCNC: 0.3 MG/DL (ref 0–1)
BUN BLDV-MCNC: 12 MG/DL (ref 7–20)
CALCIUM SERPL-MCNC: 9.4 MG/DL (ref 8.3–10.6)
CHLORIDE BLD-SCNC: 98 MMOL/L (ref 99–110)
CO2: 24 MMOL/L (ref 21–32)
CREAT SERPL-MCNC: 1.4 MG/DL (ref 0.9–1.3)
EOSINOPHILS ABSOLUTE: 0 K/UL (ref 0–0.6)
EOSINOPHILS RELATIVE PERCENT: 0.2 %
GFR AFRICAN AMERICAN: >60
GFR NON-AFRICAN AMERICAN: 57
GLUCOSE BLD-MCNC: 144 MG/DL (ref 70–99)
HCT VFR BLD CALC: 40.3 % (ref 40.5–52.5)
HEMOGLOBIN: 13.5 G/DL (ref 13.5–17.5)
LACTIC ACID: 0.9 MMOL/L (ref 0.4–2)
LYMPHOCYTES ABSOLUTE: 1.8 K/UL (ref 1–5.1)
LYMPHOCYTES RELATIVE PERCENT: 13 %
MCH RBC QN AUTO: 28.8 PG (ref 26–34)
MCHC RBC AUTO-ENTMCNC: 33.6 G/DL (ref 31–36)
MCV RBC AUTO: 85.8 FL (ref 80–100)
MONOCYTES ABSOLUTE: 1.3 K/UL (ref 0–1.3)
MONOCYTES RELATIVE PERCENT: 9.2 %
NEUTROPHILS ABSOLUTE: 10.6 K/UL (ref 1.7–7.7)
NEUTROPHILS RELATIVE PERCENT: 77 %
PDW BLD-RTO: 12.9 % (ref 12.4–15.4)
PLATELET # BLD: 170 K/UL (ref 135–450)
PMV BLD AUTO: 8.5 FL (ref 5–10.5)
POTASSIUM SERPL-SCNC: 4.2 MMOL/L (ref 3.5–5.1)
RBC # BLD: 4.7 M/UL (ref 4.2–5.9)
SODIUM BLD-SCNC: 137 MMOL/L (ref 136–145)
TOTAL PROTEIN: 7.3 G/DL (ref 6.4–8.2)
WBC # BLD: 13.8 K/UL (ref 4–11)

## 2022-01-07 PROCEDURE — 36415 COLL VENOUS BLD VENIPUNCTURE: CPT

## 2022-01-07 PROCEDURE — 72193 CT PELVIS W/DYE: CPT

## 2022-01-07 PROCEDURE — 99284 EMERGENCY DEPT VISIT MOD MDM: CPT

## 2022-01-07 PROCEDURE — 2500000003 HC RX 250 WO HCPCS: Performed by: NURSE PRACTITIONER

## 2022-01-07 PROCEDURE — 6360000004 HC RX CONTRAST MEDICATION: Performed by: NURSE PRACTITIONER

## 2022-01-07 PROCEDURE — 87205 SMEAR GRAM STAIN: CPT

## 2022-01-07 PROCEDURE — 87040 BLOOD CULTURE FOR BACTERIA: CPT

## 2022-01-07 PROCEDURE — 2580000003 HC RX 258: Performed by: NURSE PRACTITIONER

## 2022-01-07 PROCEDURE — 87070 CULTURE OTHR SPECIMN AEROBIC: CPT

## 2022-01-07 PROCEDURE — 80053 COMPREHEN METABOLIC PANEL: CPT

## 2022-01-07 PROCEDURE — 6370000000 HC RX 637 (ALT 250 FOR IP): Performed by: NURSE PRACTITIONER

## 2022-01-07 PROCEDURE — 85025 COMPLETE CBC W/AUTO DIFF WBC: CPT

## 2022-01-07 PROCEDURE — 83605 ASSAY OF LACTIC ACID: CPT

## 2022-01-07 PROCEDURE — 96365 THER/PROPH/DIAG IV INF INIT: CPT

## 2022-01-07 RX ORDER — CLINDAMYCIN PHOSPHATE 600 MG/50ML
600 INJECTION INTRAVENOUS ONCE
Status: COMPLETED | OUTPATIENT
Start: 2022-01-07 | End: 2022-01-07

## 2022-01-07 RX ORDER — 0.9 % SODIUM CHLORIDE 0.9 %
1000 INTRAVENOUS SOLUTION INTRAVENOUS ONCE
Status: COMPLETED | OUTPATIENT
Start: 2022-01-07 | End: 2022-01-08

## 2022-01-07 RX ORDER — HYDROCODONE BITARTRATE AND ACETAMINOPHEN 5; 325 MG/1; MG/1
2 TABLET ORAL ONCE
Status: COMPLETED | OUTPATIENT
Start: 2022-01-07 | End: 2022-01-07

## 2022-01-07 RX ORDER — LIDOCAINE HYDROCHLORIDE AND EPINEPHRINE 10; 10 MG/ML; UG/ML
20 INJECTION, SOLUTION INFILTRATION; PERINEURAL ONCE
Status: DISCONTINUED | OUTPATIENT
Start: 2022-01-07 | End: 2022-01-08 | Stop reason: HOSPADM

## 2022-01-07 RX ORDER — IBUPROFEN 600 MG/1
600 TABLET ORAL ONCE
Status: COMPLETED | OUTPATIENT
Start: 2022-01-07 | End: 2022-01-07

## 2022-01-07 RX ORDER — DOCUSATE SODIUM 100 MG/1
100 CAPSULE, LIQUID FILLED ORAL 2 TIMES DAILY
Qty: 30 CAPSULE | Refills: 0 | Status: SHIPPED | OUTPATIENT
Start: 2022-01-07 | End: 2022-01-27

## 2022-01-07 RX ADMIN — IBUPROFEN 600 MG: 600 TABLET, FILM COATED ORAL at 21:45

## 2022-01-07 RX ADMIN — HYDROCODONE BITARTRATE AND ACETAMINOPHEN 2 TABLET: 5; 325 TABLET ORAL at 21:45

## 2022-01-07 RX ADMIN — IOPAMIDOL 100 ML: 755 INJECTION, SOLUTION INTRAVENOUS at 23:53

## 2022-01-07 RX ADMIN — CLINDAMYCIN PHOSPHATE 600 MG: 600 INJECTION, SOLUTION INTRAVENOUS at 23:04

## 2022-01-07 RX ADMIN — SODIUM CHLORIDE 1000 ML: 9 INJECTION, SOLUTION INTRAVENOUS at 23:05

## 2022-01-07 ASSESSMENT — ENCOUNTER SYMPTOMS
SHORTNESS OF BREATH: 0
BACK PAIN: 0
VOMITING: 0
RECTAL PAIN: 1
BLOOD IN STOOL: 0
NAUSEA: 0
RHINORRHEA: 0
ANAL BLEEDING: 0
COUGH: 0
CONSTIPATION: 1
EYE PAIN: 0
ABDOMINAL PAIN: 0
DIARRHEA: 0
ABDOMINAL DISTENTION: 0
SORE THROAT: 0

## 2022-01-07 ASSESSMENT — PAIN SCALES - GENERAL: PAINLEVEL_OUTOF10: 10

## 2022-01-07 ASSESSMENT — PAIN DESCRIPTION - PAIN TYPE: TYPE: ACUTE PAIN

## 2022-01-07 ASSESSMENT — PAIN DESCRIPTION - LOCATION: LOCATION: RECTUM

## 2022-01-07 ASSESSMENT — PAIN DESCRIPTION - FREQUENCY: FREQUENCY: CONTINUOUS

## 2022-01-07 ASSESSMENT — PAIN DESCRIPTION - DESCRIPTORS: DESCRIPTORS: SHARP;POUNDING;PRESSURE

## 2022-01-07 NOTE — Clinical Note
Patient Class: Inpatient [101]   REQUIRED: Diagnosis: Hayde-rectal abscess [266114]   Estimated Length of Stay: Estimated stay of more than 2 midnights   Admitting Provider: Douglas Charles [[id number]]

## 2022-01-08 ENCOUNTER — ANESTHESIA (OUTPATIENT)
Dept: OPERATING ROOM | Age: 37
DRG: 710 | End: 2022-01-08
Payer: COMMERCIAL

## 2022-01-08 ENCOUNTER — ANESTHESIA EVENT (OUTPATIENT)
Dept: OPERATING ROOM | Age: 37
DRG: 710 | End: 2022-01-08
Payer: COMMERCIAL

## 2022-01-08 VITALS
DIASTOLIC BLOOD PRESSURE: 73 MMHG | RESPIRATION RATE: 13 BRPM | SYSTOLIC BLOOD PRESSURE: 126 MMHG | TEMPERATURE: 99 F | OXYGEN SATURATION: 95 %

## 2022-01-08 PROBLEM — K61.1 PERI-RECTAL ABSCESS: Status: ACTIVE | Noted: 2022-01-08

## 2022-01-08 LAB — SARS-COV-2, NAAT: NOT DETECTED

## 2022-01-08 PROCEDURE — 6370000000 HC RX 637 (ALT 250 FOR IP): Performed by: SURGERY

## 2022-01-08 PROCEDURE — 6360000002 HC RX W HCPCS: Performed by: SURGERY

## 2022-01-08 PROCEDURE — 3600000012 HC SURGERY LEVEL 2 ADDTL 15MIN: Performed by: SURGERY

## 2022-01-08 PROCEDURE — 87635 SARS-COV-2 COVID-19 AMP PRB: CPT

## 2022-01-08 PROCEDURE — 87070 CULTURE OTHR SPECIMN AEROBIC: CPT

## 2022-01-08 PROCEDURE — 99222 1ST HOSP IP/OBS MODERATE 55: CPT | Performed by: SURGERY

## 2022-01-08 PROCEDURE — 46040 I&D ISCHIORCT&/PERIRCT ABSC: CPT | Performed by: SURGERY

## 2022-01-08 PROCEDURE — 2500000003 HC RX 250 WO HCPCS: Performed by: ANESTHESIOLOGY

## 2022-01-08 PROCEDURE — 6360000002 HC RX W HCPCS: Performed by: ANESTHESIOLOGY

## 2022-01-08 PROCEDURE — 1200000000 HC SEMI PRIVATE

## 2022-01-08 PROCEDURE — 0J9B0ZZ DRAINAGE OF PERINEUM SUBCUTANEOUS TISSUE AND FASCIA, OPEN APPROACH: ICD-10-PCS | Performed by: SURGERY

## 2022-01-08 PROCEDURE — 2580000003 HC RX 258: Performed by: SURGERY

## 2022-01-08 PROCEDURE — 87116 MYCOBACTERIA CULTURE: CPT

## 2022-01-08 PROCEDURE — 87076 CULTURE ANAEROBE IDENT EACH: CPT

## 2022-01-08 PROCEDURE — 87206 SMEAR FLUORESCENT/ACID STAI: CPT

## 2022-01-08 PROCEDURE — 2500000003 HC RX 250 WO HCPCS: Performed by: SURGERY

## 2022-01-08 PROCEDURE — 88305 TISSUE EXAM BY PATHOLOGIST: CPT

## 2022-01-08 PROCEDURE — 3700000001 HC ADD 15 MINUTES (ANESTHESIA): Performed by: SURGERY

## 2022-01-08 PROCEDURE — 3700000000 HC ANESTHESIA ATTENDED CARE: Performed by: SURGERY

## 2022-01-08 PROCEDURE — 7100000001 HC PACU RECOVERY - ADDTL 15 MIN: Performed by: SURGERY

## 2022-01-08 PROCEDURE — 87205 SMEAR GRAM STAIN: CPT

## 2022-01-08 PROCEDURE — 87015 SPECIMEN INFECT AGNT CONCNTJ: CPT

## 2022-01-08 PROCEDURE — 2709999900 HC NON-CHARGEABLE SUPPLY: Performed by: SURGERY

## 2022-01-08 PROCEDURE — 87075 CULTR BACTERIA EXCEPT BLOOD: CPT

## 2022-01-08 PROCEDURE — 3600000002 HC SURGERY LEVEL 2 BASE: Performed by: SURGERY

## 2022-01-08 PROCEDURE — 7100000000 HC PACU RECOVERY - FIRST 15 MIN: Performed by: SURGERY

## 2022-01-08 PROCEDURE — 87102 FUNGUS ISOLATION CULTURE: CPT

## 2022-01-08 PROCEDURE — 88112 CYTOPATH CELL ENHANCE TECH: CPT

## 2022-01-08 RX ORDER — ACETAMINOPHEN 325 MG/1
650 TABLET ORAL EVERY 6 HOURS
Status: DISCONTINUED | OUTPATIENT
Start: 2022-01-08 | End: 2022-01-10 | Stop reason: HOSPADM

## 2022-01-08 RX ORDER — POTASSIUM CHLORIDE 7.45 MG/ML
10 INJECTION INTRAVENOUS PRN
Status: DISCONTINUED | OUTPATIENT
Start: 2022-01-08 | End: 2022-01-10 | Stop reason: HOSPADM

## 2022-01-08 RX ORDER — DEXAMETHASONE SODIUM PHOSPHATE 4 MG/ML
INJECTION, SOLUTION INTRA-ARTICULAR; INTRALESIONAL; INTRAMUSCULAR; INTRAVENOUS; SOFT TISSUE PRN
Status: DISCONTINUED | OUTPATIENT
Start: 2022-01-08 | End: 2022-01-08 | Stop reason: SDUPTHER

## 2022-01-08 RX ORDER — PROMETHAZINE HYDROCHLORIDE 25 MG/ML
6.25 INJECTION, SOLUTION INTRAMUSCULAR; INTRAVENOUS
Status: DISCONTINUED | OUTPATIENT
Start: 2022-01-08 | End: 2022-01-08 | Stop reason: HOSPADM

## 2022-01-08 RX ORDER — SODIUM CHLORIDE 9 MG/ML
25 INJECTION, SOLUTION INTRAVENOUS PRN
Status: DISCONTINUED | OUTPATIENT
Start: 2022-01-08 | End: 2022-01-10 | Stop reason: HOSPADM

## 2022-01-08 RX ORDER — SUCCINYLCHOLINE/SOD CL,ISO/PF 200MG/10ML
SYRINGE (ML) INTRAVENOUS PRN
Status: DISCONTINUED | OUTPATIENT
Start: 2022-01-08 | End: 2022-01-08 | Stop reason: SDUPTHER

## 2022-01-08 RX ORDER — PROPOFOL 10 MG/ML
INJECTION, EMULSION INTRAVENOUS PRN
Status: DISCONTINUED | OUTPATIENT
Start: 2022-01-08 | End: 2022-01-08 | Stop reason: SDUPTHER

## 2022-01-08 RX ORDER — MAGNESIUM HYDROXIDE 1200 MG/15ML
LIQUID ORAL CONTINUOUS PRN
Status: COMPLETED | OUTPATIENT
Start: 2022-01-08 | End: 2022-01-08

## 2022-01-08 RX ORDER — ONDANSETRON 4 MG/1
4 TABLET, ORALLY DISINTEGRATING ORAL EVERY 8 HOURS PRN
Status: DISCONTINUED | OUTPATIENT
Start: 2022-01-08 | End: 2022-01-10 | Stop reason: HOSPADM

## 2022-01-08 RX ORDER — BUPIVACAINE HYDROCHLORIDE 5 MG/ML
INJECTION, SOLUTION EPIDURAL; INTRACAUDAL
Status: COMPLETED | OUTPATIENT
Start: 2022-01-08 | End: 2022-01-08

## 2022-01-08 RX ORDER — CIPROFLOXACIN 2 MG/ML
400 INJECTION, SOLUTION INTRAVENOUS EVERY 12 HOURS
Status: DISCONTINUED | OUTPATIENT
Start: 2022-01-08 | End: 2022-01-10 | Stop reason: HOSPADM

## 2022-01-08 RX ORDER — OXYCODONE HYDROCHLORIDE 10 MG/1
10 TABLET ORAL EVERY 4 HOURS PRN
Status: DISCONTINUED | OUTPATIENT
Start: 2022-01-08 | End: 2022-01-10 | Stop reason: HOSPADM

## 2022-01-08 RX ORDER — MAGNESIUM SULFATE IN WATER 40 MG/ML
2000 INJECTION, SOLUTION INTRAVENOUS PRN
Status: DISCONTINUED | OUTPATIENT
Start: 2022-01-08 | End: 2022-01-10 | Stop reason: HOSPADM

## 2022-01-08 RX ORDER — ONDANSETRON 2 MG/ML
4 INJECTION INTRAMUSCULAR; INTRAVENOUS EVERY 6 HOURS PRN
Status: DISCONTINUED | OUTPATIENT
Start: 2022-01-08 | End: 2022-01-10 | Stop reason: HOSPADM

## 2022-01-08 RX ORDER — LABETALOL HYDROCHLORIDE 5 MG/ML
5 INJECTION, SOLUTION INTRAVENOUS EVERY 10 MIN PRN
Status: DISCONTINUED | OUTPATIENT
Start: 2022-01-08 | End: 2022-01-08 | Stop reason: HOSPADM

## 2022-01-08 RX ORDER — DOCUSATE SODIUM 100 MG/1
100 CAPSULE, LIQUID FILLED ORAL 2 TIMES DAILY
Status: DISCONTINUED | OUTPATIENT
Start: 2022-01-08 | End: 2022-01-10 | Stop reason: HOSPADM

## 2022-01-08 RX ORDER — ONDANSETRON 2 MG/ML
INJECTION INTRAMUSCULAR; INTRAVENOUS PRN
Status: DISCONTINUED | OUTPATIENT
Start: 2022-01-08 | End: 2022-01-08 | Stop reason: SDUPTHER

## 2022-01-08 RX ORDER — FENTANYL CITRATE 50 UG/ML
INJECTION, SOLUTION INTRAMUSCULAR; INTRAVENOUS PRN
Status: DISCONTINUED | OUTPATIENT
Start: 2022-01-08 | End: 2022-01-08 | Stop reason: SDUPTHER

## 2022-01-08 RX ORDER — OXYCODONE HYDROCHLORIDE 5 MG/1
5 TABLET ORAL EVERY 4 HOURS PRN
Status: DISCONTINUED | OUTPATIENT
Start: 2022-01-08 | End: 2022-01-10 | Stop reason: HOSPADM

## 2022-01-08 RX ORDER — SODIUM CHLORIDE 0.9 % (FLUSH) 0.9 %
5-40 SYRINGE (ML) INJECTION EVERY 12 HOURS SCHEDULED
Status: DISCONTINUED | OUTPATIENT
Start: 2022-01-08 | End: 2022-01-10 | Stop reason: HOSPADM

## 2022-01-08 RX ORDER — SODIUM CHLORIDE 0.9 % (FLUSH) 0.9 %
5-40 SYRINGE (ML) INJECTION PRN
Status: DISCONTINUED | OUTPATIENT
Start: 2022-01-08 | End: 2022-01-10 | Stop reason: HOSPADM

## 2022-01-08 RX ORDER — FENTANYL CITRATE 50 UG/ML
25 INJECTION, SOLUTION INTRAMUSCULAR; INTRAVENOUS EVERY 5 MIN PRN
Status: DISCONTINUED | OUTPATIENT
Start: 2022-01-08 | End: 2022-01-08 | Stop reason: HOSPADM

## 2022-01-08 RX ORDER — LIDOCAINE HYDROCHLORIDE 20 MG/ML
INJECTION, SOLUTION EPIDURAL; INFILTRATION; INTRACAUDAL; PERINEURAL PRN
Status: DISCONTINUED | OUTPATIENT
Start: 2022-01-08 | End: 2022-01-08 | Stop reason: SDUPTHER

## 2022-01-08 RX ORDER — SODIUM CHLORIDE, SODIUM LACTATE, POTASSIUM CHLORIDE, CALCIUM CHLORIDE 600; 310; 30; 20 MG/100ML; MG/100ML; MG/100ML; MG/100ML
INJECTION, SOLUTION INTRAVENOUS CONTINUOUS
Status: DISCONTINUED | OUTPATIENT
Start: 2022-01-08 | End: 2022-01-09

## 2022-01-08 RX ADMIN — METRONIDAZOLE 500 MG: 500 INJECTION, SOLUTION INTRAVENOUS at 11:00

## 2022-01-08 RX ADMIN — OXYCODONE HYDROCHLORIDE 10 MG: 10 TABLET ORAL at 21:04

## 2022-01-08 RX ADMIN — HYDROMORPHONE HYDROCHLORIDE 0.5 MG: 1 INJECTION, SOLUTION INTRAMUSCULAR; INTRAVENOUS; SUBCUTANEOUS at 10:20

## 2022-01-08 RX ADMIN — ACETAMINOPHEN 650 MG: 325 TABLET ORAL at 21:05

## 2022-01-08 RX ADMIN — SODIUM CHLORIDE, POTASSIUM CHLORIDE, SODIUM LACTATE AND CALCIUM CHLORIDE: 600; 310; 30; 20 INJECTION, SOLUTION INTRAVENOUS at 14:31

## 2022-01-08 RX ADMIN — DEXAMETHASONE SODIUM PHOSPHATE 10 MG: 4 INJECTION, SOLUTION INTRAMUSCULAR; INTRAVENOUS at 12:10

## 2022-01-08 RX ADMIN — DOCUSATE SODIUM 100 MG: 100 CAPSULE ORAL at 14:31

## 2022-01-08 RX ADMIN — ONDANSETRON 4 MG: 2 INJECTION INTRAMUSCULAR; INTRAVENOUS at 12:10

## 2022-01-08 RX ADMIN — CIPROFLOXACIN 400 MG: 2 INJECTION, SOLUTION INTRAVENOUS at 10:37

## 2022-01-08 RX ADMIN — Medication 120 MG: at 12:10

## 2022-01-08 RX ADMIN — OXYCODONE HYDROCHLORIDE 10 MG: 10 TABLET ORAL at 14:30

## 2022-01-08 RX ADMIN — DOCUSATE SODIUM 100 MG: 100 CAPSULE ORAL at 21:04

## 2022-01-08 RX ADMIN — HYDROMORPHONE HYDROCHLORIDE 1 MG: 1 INJECTION, SOLUTION INTRAMUSCULAR; INTRAVENOUS; SUBCUTANEOUS at 12:22

## 2022-01-08 RX ADMIN — ACETAMINOPHEN 650 MG: 325 TABLET ORAL at 17:20

## 2022-01-08 RX ADMIN — LIDOCAINE HYDROCHLORIDE 100 MG: 20 INJECTION, SOLUTION EPIDURAL; INFILTRATION; INTRACAUDAL; PERINEURAL at 12:10

## 2022-01-08 RX ADMIN — METRONIDAZOLE 500 MG: 500 INJECTION, SOLUTION INTRAVENOUS at 17:22

## 2022-01-08 RX ADMIN — PROPOFOL 200 MG: 10 INJECTION, EMULSION INTRAVENOUS at 12:10

## 2022-01-08 RX ADMIN — CIPROFLOXACIN 400 MG: 2 INJECTION, SOLUTION INTRAVENOUS at 21:13

## 2022-01-08 RX ADMIN — FENTANYL CITRATE 100 MCG: 50 INJECTION INTRAMUSCULAR; INTRAVENOUS at 12:10

## 2022-01-08 ASSESSMENT — PAIN DESCRIPTION - FREQUENCY
FREQUENCY: CONTINUOUS
FREQUENCY: CONTINUOUS

## 2022-01-08 ASSESSMENT — PAIN DESCRIPTION - ONSET
ONSET: ON-GOING
ONSET: ON-GOING

## 2022-01-08 ASSESSMENT — PAIN DESCRIPTION - PAIN TYPE
TYPE: SURGICAL PAIN
TYPE: ACUTE PAIN

## 2022-01-08 ASSESSMENT — PULMONARY FUNCTION TESTS
PIF_VALUE: 4
PIF_VALUE: 15
PIF_VALUE: 15
PIF_VALUE: 1
PIF_VALUE: 22
PIF_VALUE: 21
PIF_VALUE: 13
PIF_VALUE: 3
PIF_VALUE: 13
PIF_VALUE: 19
PIF_VALUE: 27
PIF_VALUE: 30
PIF_VALUE: 19
PIF_VALUE: 19
PIF_VALUE: 28
PIF_VALUE: 2
PIF_VALUE: 15
PIF_VALUE: 4
PIF_VALUE: 15
PIF_VALUE: 2
PIF_VALUE: 1
PIF_VALUE: 0
PIF_VALUE: 19
PIF_VALUE: 3
PIF_VALUE: 19
PIF_VALUE: 7
PIF_VALUE: 20
PIF_VALUE: 15
PIF_VALUE: 13
PIF_VALUE: 19
PIF_VALUE: 15
PIF_VALUE: 13
PIF_VALUE: 0
PIF_VALUE: 4

## 2022-01-08 ASSESSMENT — PAIN SCALES - GENERAL
PAINLEVEL_OUTOF10: 3
PAINLEVEL_OUTOF10: 3
PAINLEVEL_OUTOF10: 2
PAINLEVEL_OUTOF10: 0
PAINLEVEL_OUTOF10: 5
PAINLEVEL_OUTOF10: 2
PAINLEVEL_OUTOF10: 7
PAINLEVEL_OUTOF10: 7
PAINLEVEL_OUTOF10: 6

## 2022-01-08 ASSESSMENT — PAIN - FUNCTIONAL ASSESSMENT
PAIN_FUNCTIONAL_ASSESSMENT: PREVENTS OR INTERFERES SOME ACTIVE ACTIVITIES AND ADLS
PAIN_FUNCTIONAL_ASSESSMENT: PREVENTS OR INTERFERES SOME ACTIVE ACTIVITIES AND ADLS
PAIN_FUNCTIONAL_ASSESSMENT: 0-10
PAIN_FUNCTIONAL_ASSESSMENT: PREVENTS OR INTERFERES SOME ACTIVE ACTIVITIES AND ADLS

## 2022-01-08 ASSESSMENT — PAIN DESCRIPTION - LOCATION
LOCATION: RECTUM
LOCATION: RECTUM
LOCATION: RECTUM;PERINEUM
LOCATION: RECTUM
LOCATION: PERINEUM;RECTUM

## 2022-01-08 ASSESSMENT — PAIN DESCRIPTION - DESCRIPTORS
DESCRIPTORS: DULL
DESCRIPTORS: DISCOMFORT
DESCRIPTORS: ACHING
DESCRIPTORS: ACHING
DESCRIPTORS: DULL
DESCRIPTORS: BURNING
DESCRIPTORS: PRESSURE
DESCRIPTORS: BURNING

## 2022-01-08 ASSESSMENT — PAIN DESCRIPTION - PROGRESSION
CLINICAL_PROGRESSION: NOT CHANGED
CLINICAL_PROGRESSION: GRADUALLY IMPROVING

## 2022-01-08 ASSESSMENT — PAIN DESCRIPTION - ORIENTATION
ORIENTATION: MID
ORIENTATION: MID

## 2022-01-08 NOTE — ED PROVIDER NOTES
4.0    Smokeless tobacco: Never Used   Vaping Use    Vaping Use: Never used   Substance and Sexual Activity    Alcohol use: Not Currently    Drug use: Never    Sexual activity: None   Other Topics Concern    None   Social History Narrative    None     Social Determinants of Health     Financial Resource Strain:     Difficulty of Paying Living Expenses: Not on file   Food Insecurity:     Worried About Running Out of Food in the Last Year: Not on file    Yue of Food in the Last Year: Not on file   Transportation Needs:     Lack of Transportation (Medical): Not on file    Lack of Transportation (Non-Medical): Not on file   Physical Activity:     Days of Exercise per Week: Not on file    Minutes of Exercise per Session: Not on file   Stress:     Feeling of Stress : Not on file   Social Connections:     Frequency of Communication with Friends and Family: Not on file    Frequency of Social Gatherings with Friends and Family: Not on file    Attends Alevism Services: Not on file    Active Member of [address] [place] [address] or Organizations: Not on file    Attends Club or Organization Meetings: Not on file    Marital Status: Not on file   Intimate Partner Violence:     Fear of Current or Ex-Partner: Not on file    Emotionally Abused: Not on file    Physically Abused: Not on file    Sexually Abused: Not on file   Housing Stability:     Unable to Pay for Housing in the Last Year: Not on file    Number of [place] in the Last Year: Not on file    Unstable Housing in the Last Year: Not on file     History reviewed. No pertinent family history.     PHYSICAL EXAM    VITAL SIGNS: /76   Pulse 84   Temp 99 °F (37.2 °C)   Resp 16   Ht 5' 4\" (1.626 m)   Wt 124 lb 5.4 oz (56.4 kg)   SpO2 99%   BMI 21.34 kg/m²   Constitutional:  Well developed, well nourished, no acute distress  HENT:  Atraumatic, no facial or lip swelling  Oral:  No tongue swelling, airway patent  Neck: no swelling  Respiratory:  No respiratory distress, breathing comfortably  Cardiovascular:  no JVD regular rhythm with a tachycardic rate, S1-S2. No murmurs  Musculoskeletal:  No edema, no acute deformities  Vascular: Radial pulses 2+ equal bilaterally. Brisk capillary refill. Integument: 3 x 4 cm area of fluctuance at the 12 o'clock position of the anus extending inside. No area of surrounding erythema or heat. RADIOLOGY  CT PELVIS W CONTRAST Additional Contrast? None   Final Result   Perirectal abscess measuring 4.0 x 4.7 x 5.0 cm.            Labs Reviewed   CBC WITH AUTO DIFFERENTIAL - Abnormal; Notable for the following components:       Result Value    WBC 13.8 (*)     Hematocrit 40.3 (*)     Neutrophils Absolute 10.6 (*)     All other components within normal limits    Narrative:     Performed at:  [facility]  [address] [place], [facility]   Phone [phone]   COMPREHENSIVE METABOLIC PANEL - Abnormal; Notable for the following components:    Chloride 98 (*)     Glucose 144 (*)     CREATININE 1.4 (*)     GFR Non- 57 (*)     All other components within normal limits    Narrative:     Performed at:  [facility]  [address], [place]   Phone [phone], RAPID    Narrative:     Performed at:  [address] [place] [address] Laboratory  [address], [place]   Phone [phone]   CULTURE, BLOOD 1   CULTURE, BLOOD 2   CULTURE, WOUND   CULTURE, FUNGUS   CULTURE, BODY FLUID   CULTURE WITH SMEAR, ACID FAST BACILLIUS   CULTURE, ANAEROBIC AND AEROBIC   LACTIC ACID, PLASMA    Narrative:     Performed at:  [facility]  [address], [facility]   Phone [phone]   CYTOLOGY, NON-GYN   GLUCOSE, BODY FLUID   BODY FLUID CELL COUNT WITH DIFFERENTIAL       Incision/Drainage    Date/Time: 1/7/2022 9:38 PM  Performed by: MELISSA Uriostegui - CNP  Authorized by: Génesis Dominguez MD     Consent:     Consent obtained:  Verbal    Consent given by:  Patient    Risks discussed:  Bleeding, incomplete drainage, pain, infection and damage to other organs  Location:     Type:  Abscess    Size:  3x4 cm    Location:  Anogenital    Anogenital location:  Perirectal  Pre-procedure details:     Skin preparation:  Hibiclens  Anesthesia (see MAR for exact dosages): Anesthesia method:  Local infiltration    Local anesthetic:  Lidocaine 1% WITH epi  Procedure type:     Complexity:  Simple  Procedure details:     Needle aspiration: no      Incision types:  Single straight    Incision depth:  Dermal    Scalpel blade:  11    Drainage:  Bloody    Drainage amount:  Scant    Wound treatment:  Wound left open    Packing materials:  None          ED COURSE & MEDICAL DECISION MAKING    See chart for details of medications prescribed.     Vitals:    01/07/22 2009 01/08/22 0814 01/08/22 0937 01/08/22 1051   BP: (!) 142/82 120/75 138/71 128/76   Pulse: 110 79 97 84   Resp: 17 16 18 16   Temp: 100.2 °F (37.9 °C) 99.9 °F (37.7 °C) 98.7 °F (37.1 °C) 99 °F (37.2 °C)   TempSrc: Oral  Oral    SpO2: 96% 96% 97% 99%   Weight: 124 lb 5.4 oz (56.4 kg)      Height: 5' 4\" (1.626 m)        Medications   sodium chloride flush 0.9 % injection 5-40 mL (has no administration in time range)   sodium chloride flush 0.9 % injection 5-40 mL (has no administration in time range)   0.9 % sodium chloride infusion (has no administration in time range)   ondansetron (ZOFRAN-ODT) disintegrating tablet 4 mg (has no administration in time range)     Or   ondansetron (ZOFRAN) injection 4 mg (has no administration in time range)   enoxaparin (LOVENOX) injection 40 mg (has no administration in time range)   lactated ringers infusion (has no administration in time range)   potassium chloride 10 mEq/100 mL IVPB (Peripheral Line) (has no administration in time range)   magnesium sulfate 2000 mg in 50 mL IVPB premix (has no administration in time range)   ciprofloxacin (CIPRO) IVPB 400 mg (400 mg IntraVENous New Bag 1/8/22 1037)   metronidazole (FLAGYL) 500 mg in NaCl 100 mL IVPB premix (has no administration in time range)   acetaminophen (TYLENOL) tablet 650 mg (has no administration in time range)   oxyCODONE (ROXICODONE) immediate release tablet 5 mg (has no administration in time range)     Or   oxyCODONE HCl (OXY-IR) immediate release tablet 10 mg (has no administration in time range)   HYDROmorphone (DILAUDID) injection 0.5 mg (0.5 mg IntraVENous Given 1/8/22 1020)     Or   HYDROmorphone (DILAUDID) injection 1 mg ( IntraVENous See Alternative 1/8/22 1020)   fentaNYL (SUBLIMAZE) injection 25 mcg (has no administration in time range)   HYDROmorphone (DILAUDID) injection 0.5 mg (has no administration in time range)   promethazine (PHENERGAN) injection 6.25 mg (has no administration in time range)   labetalol (NORMODYNE;TRANDATE) injection 5 mg (has no administration in time range)   HYDROcodone-acetaminophen (NORCO) 5-325 MG per tablet 2 tablet (2 tablets Oral Given 1/7/22 2145)   ibuprofen (ADVIL;MOTRIN) tablet 600 mg (600 mg Oral Given 1/7/22 2145)   0.9 % sodium chloride bolus (1,000 mLs IntraVENous New Bag 1/7/22 2305)   clindamycin (CLEOCIN) 600 mg in dextrose 5 % 50 mL IVPB (600 mg IntraVENous New Bag 1/7/22 2304)   iopamidol (ISOVUE-370) 76 % injection 100 mL (100 mLs IntraVENous Given 1/7/22 2353)     This patient was seen and evaluated by myself and my attending physician Dr. Lashell Ruiz. Differential diagnosis includes but is not limited to necrotizing fasciitis, septicemia, abscess, other. He is nontoxic in appearance. Arrival temperature is 100.5. He is tachycardic. This is his third ED visit in a row. He has been taking Bactrim and Keflex for an abscess that has been opened twice already.     He has increasing pain despite the antibiotics and Percocet that was prescribed to him. I performed another incision and drainage and only got blood. I asked Dr. Emigdio Louise to see the patient in which he performed a bedside ultrasound and did not really see much. We added on labs and started him on IVFs and clindamycin. I ordered a COVID screening in anticipation of possible surgical procedure. 2230 I handed off care of this patient to Dr. Emigdio Louise. Please see his note for further details. CT scan of the pelvis with IV contrast pending. FINAL IMPRESSION    1. Perirectal abscess    2. HITESH (acute kidney injury) ([facility] [address]    3. Failure of outpatient treatment    4.  Septicemia [facility])        PLAN    Admission    (Please note that this note was completed with a voice recognition program.  Every attempt was made to edit the dictations, but inevitably there remain words that are mis-transcribed.)         MELISSA Alonso - EMILI  01/08/22 5713

## 2022-01-08 NOTE — OP NOTE
Operative Report      Patient: Aby Mcmillan MRN: [id number]     YOB: 1985  Age: 39 y.o. Sex: male        Primary Care Physician: No primary care provider on file. DATE OF OPERATION: 1/8/2022     PREOPERATIVE DIAGNOSIS: perirectal abscess    POSTOPERATIVE DIAGNOSIS: same    PROCEDURE PERFORMED: incision and drainage of perirectal abscess    SURGEON: Génesis Dominguez MD    ANESTHESIA: General    ASA CLASS: 2E    DVT PROPHYLAXIS: bilateral SCDs    ANTIBIOTICS: therapeutic ciprofloxacin and flagyl IV    INDICATIONS: Aby Mcmillan presented with a 1 week history of perirectal abscess. Despite having multiple I&Ds in the emergency department, his pain worsened which led him to come back to be evaluated. He was found to have a 4x5 cm perirectal abscess on CT imaging. The risks, benefits, and alternatives were explained to the patient and he was willing to consent for the procedure. Procedure Details:       After informed consent was obtained, the patient was  brought to the operating room and placed in the supine position. General anesthesia was induced. We was then placed in the lithotomy position. He was prepped and draped in the usual sterile fashion. A timeout was then performed. He had an obvious posterior perianal area of fluctuance. A rectal exam was performed that did not reveal any significant bogginess. A 1 x 2.5 cm elliptical incision was then made over the area of fluctuance and a large amount of purulent fluid was expressed. Cultures were obtained. The abscess cavity was probed digitally, and did extend up posterior to a bigger cavity near the rectum. There was no obvious connection to the rectum. The area was then flushed with normal saline until clear. Electrocautery was used for hemostasis. The wound was then packed with 4 x 4 saline soaked gauze. 0.5% marcaine was then injected around the wound. A dry dressing was placed over the top.    The patient tolerated the procedure well and was taken to the recovery room in stable condition. Findings: perirectal abscess    Estimated Blood Loss: less than 50 ml    Complications: none           Specimen: 1.   Perirectal abscess fluid for culture                  Electronically signed by Rell Kulkarni MD on 1/8/2022 at 12:32 PM

## 2022-01-08 NOTE — ANESTHESIA PRE PROCEDURE
Ana Lindsay MD        0.9 % sodium chloride infusion  25 mL IntraVENous PRN Ana Lindsay MD        ondansetron (ZOFRAN-ODT) disintegrating tablet 4 mg  4 mg Oral Q8H PRN Ana Lindsay MD        Or    ondansetron [facility]) injection 4 mg  4 mg IntraVENous Q6H PRN Ana Lindsay MD        enoxaparin (LOVENOX) injection 40 mg  40 mg SubCUTAneous Daily Ana Lindsay MD        lactated ringers infusion   IntraVENous Continuous Ana Lindsay MD        potassium chloride 10 mEq/100 mL IVPB (Peripheral Line)  10 mEq IntraVENous PRN Ana Lindsay MD        magnesium sulfate 2000 mg in 50 mL IVPB premix  2,000 mg IntraVENous PRN Ana Lindsay MD        ciprofloxacin (CIPRO) IVPB 400 mg  400 mg IntraVENous Q12H Ana Lindsay MD        metronidazole (FLAGYL) 500 mg in NaCl 100 mL IVPB premix  500 mg IntraVENous Q8H Ana Lindsay MD        acetaminophen (TYLENOL) tablet 650 mg  650 mg Oral Q6H Ana Lindsay MD        oxyCODONE (ROXICODONE) immediate release tablet 5 mg  5 mg Oral Q4H PRN Ana Lindsay MD        Or   Mago Zhou oxyCODONE HCl (OXY-IR) immediate release tablet 10 mg  10 mg Oral Q4H PRN Ana Lindsay MD        HYDROmorphone (DILAUDID) injection 0.5 mg  0.5 mg IntraVENous Q2H PRN Ana Lindsay MD        Or    HYDROmorphone (DILAUDID) injection 1 mg  1 mg IntraVENous Q2H PRN Ana Lindsay MD         Vital Signs (Current)   Vitals:    22 09   BP: 138/71   Pulse: 97   Resp: 18   Temp: 98.7 °F (37.1 °C)   SpO2: 97%     Vital Signs Statistics (for past 48 hrs)     Temp  Av.7 °F (37.6 °C)  Min: 98.7 °F (37.1 °C)   Min taken time: 22  Max: 100.2 °F (37.9 °C)   Max taken time: 22  Pulse  Av.5  Min: 78   Min taken time: 22 0814  Max: 110   Max taken time: 22  Resp  Av.2  Min: 15   Min taken time: 22 1830 Max: 25   Max taken time: 22 1529  BP  Min: 120/75   Min taken time: 22 0814  Max: 142/82   Max taken time: 22  MAP (mmHg)  Av.8  Min: 80   Min taken time: 22 1830  Max: 104   Max taken time: 22 1530  SpO2  Av %  Min: 95 %   Min taken time: 22 1530  Max: 97 %   Max taken time: 22 0937    BP Readings from Last 3 Encounters:   22 138/71   22 121/70   22 (!) 144/97     BMI  Body mass index is 21.34 kg/m². Estimated body mass index is 21.34 kg/m² as calculated from the following:    Height as of this encounter: 5' 4\" (1.626 m). Weight as of this encounter: 124 lb 5.4 oz (56.4 kg).     CBC   Lab Results   Component Value Date    WBC 13.8 2022    RBC 4.70 2022    HGB 13.5 2022    HCT 40.3 2022    MCV 85.8 2022    RDW 12.9 2022     2022     CMP    Lab Results   Component Value Date     2022    K 4.2 2022    CL 98 2022    CO2 24 2022    BUN 12 2022    CREATININE 1.4 2022    GFRAA >60 2022    GFRAA >60 03/10/2012    AGRATIO 1.3 2022    LABGLOM 57 2022    GLUCOSE 144 2022    PROT 7.3 2022    PROT 7.2 03/10/2012    CALCIUM 9.4 2022    BILITOT 0.3 2022    ALKPHOS 73 2022    AST 15 2022    ALT 11 2022     BMP    Lab Results   Component Value Date     2022    K 4.2 2022    CL 98 2022    CO2 24 2022    BUN 12 2022    CREATININE 1.4 2022    CALCIUM 9.4 2022    GFRAA >60 2022    GFRAA >60 03/10/2012    LABGLOM 57 2022    GLUCOSE 144 2022     POCGlucose  Recent Labs     22  2244   GLUCOSE 144*      Coags  No results found for: PROTIME, INR, APTT  HCG (If Applicable) No results found for: PREGTESTUR, PREGSERUM, HCG, HCGQUANT   ABGs No results found for: PHART, PO2ART, SSW6GRN, DFJ9DWM, BEART, G1TRJYBA   Type & Screen (If Applicable)  No results found for: Avani Paz                         BMI: Wt Readings from Last 3 Encounters:       NPO Status: 8 hours                          Anesthesia Evaluation  Patient summary reviewed no history of anesthetic complications:   Airway: Mallampati: III  TM distance: >3 FB   Neck ROM: full   Dental:          Pulmonary:normal exam    (+) asthma:                            Cardiovascular:Negative CV ROS  Exercise tolerance: good (>4 METS),           Rhythm: regular  Rate: normal           Beta Blocker:  Not on Beta Blocker         Neuro/Psych:   Negative Neuro/Psych ROS              GI/Hepatic/Renal: Neg GI/Hepatic/Renal ROS            Endo/Other: Negative Endo/Other ROS                    Abdominal:             Vascular: negative vascular ROS. Other Findings: Chipped teeth          Anesthesia Plan      general     ASA 2 - emergent       Induction: intravenous. MIPS: Postoperative opioids intended and Prophylactic antiemetics administered. Anesthetic plan and risks discussed with patient. This pre-anesthesia assessment may be used as a history and physical.    DOS STAFF ADDENDUM:    Pt seen and examined, chart reviewed (including anesthesia, drug and allergy history). No interval changes to history and physical examination. Anesthetic plan, risks, benefits, alternatives, and personnel involved discussed with patient. Questions and concerns addressed. Patient(family) verbalized an understanding and agrees to proceed.       Bret Saunders MD  January 8, 2022  9:59 AM

## 2022-01-08 NOTE — PROGRESS NOTES
Pt to pacu. Guaze/mesh panties in place. Mod amount sero/sang drainage. Pt c/o 3/10 rectal pain.  VSS

## 2022-01-08 NOTE — H&P
General Surgery History & Physical      Paco Espinosa   : 1985 MRN: [id number]  Date of Admission: 2022  Admitting Zachary Willoughby MD  Primary Care Physician: No primary care provider on file. Chief Complaint   Patient presents with    Abscess     States that he has been seen here twice now and each time he is told if it gets worse to come back. States that now it has gotten bigger and more painful       Chief Complaint/Reason for consultation: \"perirectal abscess\"    Diagnosis Present on Admission:   Hayde-rectal abscess      Secondary Diagnosis  Patient Active Problem List   Diagnosis    Degeneration of C5-C6 intervertebral disc    Neck pain    Hayde-rectal abscess       History of Present Illness  Paco Espinosa is a 39 y.o. male who presents with a 1 week history of perirectal abscess. He was seen multiple times over the past week in the ED and underwent I&D x 2, but had persistent worsening pain without relief after I&D. Denies any fevers or chills. He has been constipated due to fear of having a bowel movement with the pain. No prior perianal infections. Past Medical History:   Diagnosis Date    Acute hemorrhoid     Asthma      History reviewed. No pertinent surgical history. Family history reviewed and otherwise negative. History reviewed. No pertinent family history.   Social History     Socioeconomic History    Marital status: Single     Spouse name: Not on file    Number of children: Not on file    Years of education: Not on file    Highest education level: Not on file   Occupational History    Not on file   Tobacco Use    Smoking status: Current Every Day Smoker     Packs/day: 0.50     Types: Cigarettes, Cigars     Last attempt to quit: 2018     Years since quittin.0    Smokeless tobacco: Never Used   Vaping Use    Vaping Use: Never used   Substance and Sexual Activity    Alcohol use: Not Currently    Drug use: Never    Sexual activity: Not on file   Other Topics Concern    Not on file   Social History Narrative    Not on file     Social Determinants of Health     Financial Resource Strain:     Difficulty of Paying Living Expenses: Not on file   Food Insecurity:     Worried About Running Out of Food in the Last Year: Not on file    Yue of Food in the Last Year: Not on file   Transportation Needs:     Lack of Transportation (Medical): Not on file    Lack of Transportation (Non-Medical): Not on file   Physical Activity:     Days of Exercise per Week: Not on file    Minutes of Exercise per Session: Not on file   Stress:     Feeling of Stress : Not on file   Social Connections:     Frequency of Communication with Friends and Family: Not on file    Frequency of Social Gatherings with Friends and Family: Not on file    Attends Taoist Services: Not on file    Active Member of [employer] Group or Organizations: Not on file    Attends Club or Organization Meetings: Not on file    Marital Status: Not on file   Intimate Partner Violence:     Fear of Current or Ex-Partner: Not on file    Emotionally Abused: Not on file    Physically Abused: Not on file    Sexually Abused: Not on file   Housing Stability:     Unable to Pay for Housing in the Last Year: Not on file    Number of [place] in the Last Year: Not on file    Unstable Housing in the Last Year: Not on file     No Known Allergies  Prior to Admission medications    Medication Sig Start Date End Date Taking? Authorizing Provider   docusate sodium (COLACE) 100 MG capsule Take 1 capsule by mouth 2 times daily 1/7/22  Yes MELISSA Ngyuen CNP   oxyCODONE-acetaminophen (PERCOCET) 5-325 MG per tablet Take 1 tablet by mouth every 6 hours as needed for Pain for up to 3 days. TAKE IN PLACE OF THE HYDROCODONE-ACETAMINOPHEN (NORCO) MEDICATION YOU WERE PREVIOUSLY PRESCRIBED.  DO NOT TAKE BOTH TOGETHER. 1/6/22 1/9/22  SHELLIE Mcdaniels   cephALEXin (KEFLEX) 500 MG capsule Take 1 capsule by mouth 4 times daily for 10 days 1/5/22 1/15/22  Nigel Walker, APRN - CNP   sulfamethoxazole-trimethoprim (BACTRIM DS) 800-160 MG per tablet Take 1 tablet by mouth 2 times daily for 10 days 1/5/22 1/15/22  Nigel Walker APRN - CNP       Review of Systems  Pertinent positives and negatives are in HPI, otherwise all systems reviewed and negative    Physical Exam  Vitals:    01/08/22 0937   BP: 138/71   Pulse: 97   Resp: 18   Temp: 98.7 °F (37.1 °C)   SpO2: 97%       General/Appearance: NAD, appears stated age  HEENT: PERRLA, Conjunctiva non injected, no scleral icterus. Mucous membranes pink and moist.  Neck is symmetrical. Trachea appears midline. Lung: normal respiratory effort, no accessory muscle use  Cardiac: Regular rate and rhythm  Abdomen: soft, NT, ND  Rectal: posterior perianal fluctuance, very tender to palpation, minimal erythema  Neuro: No gross motor or sensory deficits. Skin: No open wounds or rashes. Labs  Recent Labs     01/07/22  2244   WBC 13.8*   HGB 13.5   HCT 40.3*        Recent Labs     01/07/22  2244      K 4.2   CL 98*   CO2 24   BUN 12   GFRAA >60     Recent Labs     01/07/22  2244   AST 15   ALT 11     Invalid input(s): [facility], [facility], [facility], [place], [facility], [place], [place], [place], [place], [facility]    Imaging  CT PELVIS W CONTRAST Additional Contrast? None   Final Result   Perirectal abscess measuring 4.0 x 4.7 x 5.0 cm. CT pelvis personally reviewed, showing posterior perirectal abscess    Assessment  Vossan Hauser Fate Duane is a 39 y.o. male admitted for perirectal abscess    Plan    1. Perirectal abscess  · WBC 13.8. Had had multiple attempted I&Ds in the ED. Will proceed to the OR for incision and drainage of perirectal abscess. The risks, benefits, and alternatives were discussed with the patient and he is willing to consent for the operation. On cipro/flagyl.       Aggie Ibrahim MD

## 2022-01-08 NOTE — ANESTHESIA POSTPROCEDURE EVALUATION
[facility] Department of Anesthesiology  Post-Anesthesia Note       Name:  Sergei Kaplan                                  Age:  39 y.o. MRN:  [id number]     Last Vitals & Oxygen Saturation: BP (!) 151/98   Pulse 97   Temp 98.2 °F (36.8 °C) (Temporal)   Resp 16   Ht 5' 4\" (1.626 m)   Wt 124 lb 5.4 oz (56.4 kg)   SpO2 97%   BMI 21.34 kg/m²   Patient Vitals for the past 4 hrs:   BP Temp Temp src Pulse Resp SpO2   01/08/22 1258 (!) 151/98   97 16 97 %   01/08/22 1252 (!) 134/91   100 17 100 %   01/08/22 1251 (!) 142/84   98 15 100 %   01/08/22 1249    113 20 100 %   01/08/22 1245  98.2 °F (36.8 °C) Temporal 111 9    01/08/22 1051 128/76 99 °F (37.2 °C)  84 16 99 %   01/08/22 0937 138/71 98.7 °F (37.1 °C) Oral 97 18 97 %       Level of consciousness:  Awake, alert    Respiratory: Respirations easy, no distress. Stable. Cardiovascular: Hemodynamically stable. Hydration: Adequate. PONV: Adequately managed. Post-op pain: Adequately controlled. Post-op assessment: Tolerated anesthetic well without complication. Complications:  None.     Pawan Naylor MD  January 8, 2022   1:06 PM

## 2022-01-08 NOTE — ED NOTES
Reported to [facility] RN @ [address] [place] [address] here     Lv Amador RN  01/08/22 2269

## 2022-01-08 NOTE — ED PROVIDER NOTES
I independently evaluated and obtained a history and physical on [facility]. All diagnostic, treatment, and disposition assistants were made to myself in conjunction the advanced practice provider. For further details of this patient's emergency department encounter, please see the advanced practice provider's documentation. History: 69-year-old male presents emergency room complaining of perirectal abscess that has been worsening despite oral antibiotics at home. Patient was seen in the emergency room on 1/5/2022 and 1/6/2022. There was attempt at drainage here in the emergency room but no purulent material was drained. Patient has been taking Keflex and Bactrim at home but states the pain is getting worse and he thinks the abscess is getting bigger. Denies any documented fevers at home. No nausea or vomiting. States has been using ibuprofen for pain control with minimal relief. Physician Exam: Alert and oriented x4, normocephalic atraumatic moist 6 membranes, tachycardic, regular rhythm, lungs to auscultation bilaterally, abdomen soft nontender nondistended, 3 x 4 cm area of fluctuance at the 12 o'clock position of the rectum extending toward the anus. No hemorrhoids, no bleeding      10:21 PM EST  Bedside I&D was attempted by Kasi Lockhart NP with only blood and no purulent material obtained. Bedside ultrasound was performed per myself to evaluate for possible fluid collection and if there is a fluid collection appears to close to the rectum to perform any further I&D here in the emergency room. We will proceed with septic labs, CT abdomen pelvis, antibiotics and consult general surgery. 12:13 AM EST  CT pelvis shows a 4 x 4.7 x 5 cm perirectal abscess. With patient's elevated white cell count, fever and tachycardia, he does meet sepsis criteria. Clindamycin ordered for broad-spectrum antibiotics. Consult placed to general surgery for admission.     ED Course as of 01/08/22 0033   Faiza Rodriguez Jan 07,

## 2022-01-09 LAB
ANION GAP SERPL CALCULATED.3IONS-SCNC: 10 MMOL/L (ref 3–16)
BASOPHILS ABSOLUTE: 0.1 K/UL (ref 0–0.2)
BASOPHILS RELATIVE PERCENT: 0.6 %
BUN BLDV-MCNC: 9 MG/DL (ref 7–20)
CALCIUM SERPL-MCNC: 9.5 MG/DL (ref 8.3–10.6)
CHLORIDE BLD-SCNC: 102 MMOL/L (ref 99–110)
CO2: 29 MMOL/L (ref 21–32)
CREAT SERPL-MCNC: 1.1 MG/DL (ref 0.9–1.3)
EOSINOPHILS ABSOLUTE: 0.1 K/UL (ref 0–0.6)
EOSINOPHILS RELATIVE PERCENT: 0.6 %
GFR AFRICAN AMERICAN: >60
GFR NON-AFRICAN AMERICAN: >60
GLUCOSE BLD-MCNC: 107 MG/DL (ref 70–99)
HCT VFR BLD CALC: 41.2 % (ref 40.5–52.5)
HEMOGLOBIN: 13.7 G/DL (ref 13.5–17.5)
LYMPHOCYTES ABSOLUTE: 2.6 K/UL (ref 1–5.1)
LYMPHOCYTES RELATIVE PERCENT: 20.2 %
MAGNESIUM: 2.1 MG/DL (ref 1.8–2.4)
MCH RBC QN AUTO: 28.2 PG (ref 26–34)
MCHC RBC AUTO-ENTMCNC: 33.3 G/DL (ref 31–36)
MCV RBC AUTO: 84.6 FL (ref 80–100)
MONOCYTES ABSOLUTE: 1.2 K/UL (ref 0–1.3)
MONOCYTES RELATIVE PERCENT: 9.5 %
NEUTROPHILS ABSOLUTE: 9 K/UL (ref 1.7–7.7)
NEUTROPHILS RELATIVE PERCENT: 69.1 %
PDW BLD-RTO: 13 % (ref 12.4–15.4)
PHOSPHORUS: 3 MG/DL (ref 2.5–4.9)
PLATELET # BLD: 178 K/UL (ref 135–450)
PMV BLD AUTO: 8.1 FL (ref 5–10.5)
POTASSIUM REFLEX MAGNESIUM: 4.2 MMOL/L (ref 3.5–5.1)
RBC # BLD: 4.87 M/UL (ref 4.2–5.9)
SODIUM BLD-SCNC: 141 MMOL/L (ref 136–145)
WBC # BLD: 13 K/UL (ref 4–11)

## 2022-01-09 PROCEDURE — 80048 BASIC METABOLIC PNL TOTAL CA: CPT

## 2022-01-09 PROCEDURE — 1200000000 HC SEMI PRIVATE

## 2022-01-09 PROCEDURE — 36415 COLL VENOUS BLD VENIPUNCTURE: CPT

## 2022-01-09 PROCEDURE — 84100 ASSAY OF PHOSPHORUS: CPT

## 2022-01-09 PROCEDURE — 83735 ASSAY OF MAGNESIUM: CPT

## 2022-01-09 PROCEDURE — 85025 COMPLETE CBC W/AUTO DIFF WBC: CPT

## 2022-01-09 PROCEDURE — 94760 N-INVAS EAR/PLS OXIMETRY 1: CPT

## 2022-01-09 PROCEDURE — 6360000002 HC RX W HCPCS: Performed by: SURGERY

## 2022-01-09 PROCEDURE — 2580000003 HC RX 258: Performed by: SURGERY

## 2022-01-09 PROCEDURE — 2500000003 HC RX 250 WO HCPCS: Performed by: SURGERY

## 2022-01-09 PROCEDURE — 99024 POSTOP FOLLOW-UP VISIT: CPT | Performed by: SURGERY

## 2022-01-09 PROCEDURE — 6370000000 HC RX 637 (ALT 250 FOR IP): Performed by: SURGERY

## 2022-01-09 RX ADMIN — METRONIDAZOLE 500 MG: 500 INJECTION, SOLUTION INTRAVENOUS at 02:53

## 2022-01-09 RX ADMIN — DOCUSATE SODIUM 100 MG: 100 CAPSULE ORAL at 20:37

## 2022-01-09 RX ADMIN — CIPROFLOXACIN 400 MG: 2 INJECTION, SOLUTION INTRAVENOUS at 20:39

## 2022-01-09 RX ADMIN — CIPROFLOXACIN 400 MG: 2 INJECTION, SOLUTION INTRAVENOUS at 11:03

## 2022-01-09 RX ADMIN — OXYCODONE HYDROCHLORIDE 10 MG: 10 TABLET ORAL at 09:47

## 2022-01-09 RX ADMIN — OXYCODONE HYDROCHLORIDE 10 MG: 10 TABLET ORAL at 02:53

## 2022-01-09 RX ADMIN — METRONIDAZOLE 500 MG: 500 INJECTION, SOLUTION INTRAVENOUS at 18:37

## 2022-01-09 RX ADMIN — METRONIDAZOLE 500 MG: 500 INJECTION, SOLUTION INTRAVENOUS at 09:49

## 2022-01-09 RX ADMIN — SODIUM CHLORIDE, PRESERVATIVE FREE 10 ML: 5 INJECTION INTRAVENOUS at 20:39

## 2022-01-09 RX ADMIN — ACETAMINOPHEN 650 MG: 325 TABLET ORAL at 05:47

## 2022-01-09 RX ADMIN — SODIUM CHLORIDE, POTASSIUM CHLORIDE, SODIUM LACTATE AND CALCIUM CHLORIDE: 600; 310; 30; 20 INJECTION, SOLUTION INTRAVENOUS at 05:50

## 2022-01-09 RX ADMIN — ACETAMINOPHEN 650 MG: 325 TABLET ORAL at 11:02

## 2022-01-09 RX ADMIN — HYDROMORPHONE HYDROCHLORIDE 1 MG: 1 INJECTION, SOLUTION INTRAMUSCULAR; INTRAVENOUS; SUBCUTANEOUS at 08:36

## 2022-01-09 RX ADMIN — DOCUSATE SODIUM 100 MG: 100 CAPSULE ORAL at 09:47

## 2022-01-09 ASSESSMENT — PAIN DESCRIPTION - PAIN TYPE
TYPE: SURGICAL PAIN

## 2022-01-09 ASSESSMENT — PAIN DESCRIPTION - DESCRIPTORS
DESCRIPTORS: DISCOMFORT
DESCRIPTORS: THROBBING
DESCRIPTORS: BURNING
DESCRIPTORS: DISCOMFORT
DESCRIPTORS: SPASM
DESCRIPTORS: BURNING

## 2022-01-09 ASSESSMENT — PAIN DESCRIPTION - LOCATION
LOCATION: RECTUM
LOCATION: PERINEUM;RECTUM
LOCATION: RECTUM
LOCATION: PERINEUM;RECTUM

## 2022-01-09 ASSESSMENT — PAIN SCALES - GENERAL
PAINLEVEL_OUTOF10: 7
PAINLEVEL_OUTOF10: 5
PAINLEVEL_OUTOF10: 3
PAINLEVEL_OUTOF10: 3
PAINLEVEL_OUTOF10: 10
PAINLEVEL_OUTOF10: 0
PAINLEVEL_OUTOF10: 2

## 2022-01-09 NOTE — PROGRESS NOTES
General Surgery  Daily Progress Note    Pt Name: Clarke Mistry Record Number: [id number]  Date of Birth 1985   Today's Date: 1/9/2022    Chief Complaint   Patient presents with    Abscess     States that he has been seen here twice now and each time he is told if it gets worse to come back. States that now it has gotten bigger and more painful       ASSESSMENT/PLAN  1. Perirectal abscess s/p I&D POD #1 - doing well. Packing removed. Continue IV abx. Await culture results. Possible discharge home tomorrow if culture results return. SUBJECTIVE  Gordon has slightly improved from yesterday. Pain is well controlled. He has no nausea and no vomiting. He has not passed flatus and has not had a bowel movement. He is tolerating regular diet. Current activity is ad jamal    OBJECTIVE  VITALS:  height is 5' 4\" (1.626 m) and weight is 132 lb 4.4 oz (60 kg). His oral temperature is 97.8 °F (36.6 °C). His blood pressure is 112/71 and his pulse is 66. His respiration is 18 and oxygen saturation is 93%. GENERAL: alert, no distress  LUNGS: normal respiratory effort, no accessory muscle use  RECTAL: packing removed, no significant drainage, no surrounding erythema  EXTREMITY: no cyanosis and no clubbing  I/O last 3 completed shifts: In: 2873.9 [P.O.:600; I.V.:1441.4; IV Piggyback:832.5]  Out: 2200 [Urine:2000; Other:150; Blood:50]  No intake/output data recorded.     LABS  Recent Labs     01/07/22  2244   WBC 13.8*   HGB 13.5   HCT 40.3*         K 4.2   CL 98*   CO2 24   BUN 12   CREATININE 1.4*   CALCIUM 9.4   AST 15   ALT 11   BILITOT 0.3     CBC with Differential:    Lab Results   Component Value Date    WBC 13.8 01/07/2022    RBC 4.70 01/07/2022    HGB 13.5 01/07/2022    HCT 40.3 01/07/2022     01/07/2022    MCV 85.8 01/07/2022    MCH 28.8 01/07/2022    MCHC 33.6 01/07/2022    RDW 12.9 01/07/2022    SEGSPCT 73.1 03/10/2012    LYMPHOPCT 13.0 01/07/2022    MONOPCT 9.2 01/07/2022 EOSPCT 1.1 03/10/2012    BASOPCT 0.6 01/07/2022    MONOSABS 1.3 01/07/2022    LYMPHSABS 1.8 01/07/2022    EOSABS 0.0 01/07/2022    BASOSABS 0.1 01/07/2022    DIFFTYPE Auto 03/10/2012     BMP:    Lab Results   Component Value Date     01/07/2022    K 4.2 01/07/2022    CL 98 01/07/2022    CO2 24 01/07/2022    BUN 12 01/07/2022    LABALBU 4.1 01/07/2022    CREATININE 1.4 01/07/2022    CALCIUM 9.4 01/07/2022    GFRAA >60 01/07/2022    GFRAA >60 03/10/2012    LABGLOM 57 01/07/2022    GLUCOSE 144 01/07/2022         Allison Mcguire MD  Electronically signed 1/9/2022 at 9:05 AM

## 2022-01-09 NOTE — PLAN OF CARE
Problem: Pain:  Goal: Pain level will decrease  Description: Pain level will decrease  1/9/2022 1113 by Ressie Frankel, RN  Outcome: Ongoing  1/9/2022 0148 by Chidi Hanks RN  Outcome: Ongoing  Goal: Control of acute pain  Description: Control of acute pain  Outcome: Ongoing  Goal: Control of chronic pain  Description: Control of chronic pain  Outcome: Ongoing     Problem: Cardiovascular  Goal: No DVT, peripheral vascular complications  2/8/7534 7167 by Ressie Frankel, RN  Outcome: Ongoing  1/9/2022 0148 by Chidi Hanks RN  Outcome: Ongoing     Problem: Skin Integrity/Risk  Goal: No skin breakdown during hospitalization  1/9/2022 1113 by Ressie Frankel, RN  Outcome: Ongoing  1/9/2022 0148 by Chidi Hanks RN  Outcome: Ongoing  Goal: Wound healing  1/9/2022 1113 by Ressie Frankel, RN  Outcome: Ongoing  1/9/2022 0148 by Chidi Hanks RN  Outcome: Ongoing

## 2022-01-09 NOTE — PLAN OF CARE
Problem: Pain:  Goal: Pain level will decrease  Description: Pain level will decrease  Outcome: Ongoing   Pain/discomfort being managed with PRN analgesics per MD orders. Pt able to express presence and absence of pain and rate pain appropriately using numerical scale. Problem: Cardiovascular  Goal: No DVT, peripheral vascular complications  Outcome: Ongoing   Lovenox as ordered. Problem: Skin Integrity/Risk  Goal: No skin breakdown during hospitalization  Outcome: Ongoing     Problem: Skin Integrity/Risk  Goal: Wound healing  Outcome: Ongoing   Skin care protocal in place. Antibiotics as ordered.

## 2022-01-10 VITALS
RESPIRATION RATE: 16 BRPM | BODY MASS INDEX: 22.58 KG/M2 | HEIGHT: 64 IN | TEMPERATURE: 97.8 F | WEIGHT: 132.28 LBS | SYSTOLIC BLOOD PRESSURE: 137 MMHG | DIASTOLIC BLOOD PRESSURE: 92 MMHG | OXYGEN SATURATION: 95 % | HEART RATE: 67 BPM

## 2022-01-10 PROCEDURE — 99024 POSTOP FOLLOW-UP VISIT: CPT | Performed by: SURGERY

## 2022-01-10 PROCEDURE — 6360000002 HC RX W HCPCS: Performed by: SURGERY

## 2022-01-10 PROCEDURE — 2500000003 HC RX 250 WO HCPCS: Performed by: SURGERY

## 2022-01-10 PROCEDURE — 99238 HOSP IP/OBS DSCHRG MGMT 30/<: CPT | Performed by: PHYSICIAN ASSISTANT

## 2022-01-10 PROCEDURE — 2580000003 HC RX 258: Performed by: SURGERY

## 2022-01-10 PROCEDURE — 99024 POSTOP FOLLOW-UP VISIT: CPT | Performed by: PHYSICIAN ASSISTANT

## 2022-01-10 PROCEDURE — APPNB45 APP NON BILLABLE 31-45 MINUTES: Performed by: PHYSICIAN ASSISTANT

## 2022-01-10 PROCEDURE — 94761 N-INVAS EAR/PLS OXIMETRY MLT: CPT

## 2022-01-10 PROCEDURE — 6370000000 HC RX 637 (ALT 250 FOR IP): Performed by: SURGERY

## 2022-01-10 RX ORDER — AMOXICILLIN AND CLAVULANATE POTASSIUM 875; 125 MG/1; MG/1
1 TABLET, FILM COATED ORAL 2 TIMES DAILY
Qty: 20 TABLET | Refills: 0 | Status: SHIPPED | OUTPATIENT
Start: 2022-01-10 | End: 2022-01-20

## 2022-01-10 RX ORDER — AMOXICILLIN AND CLAVULANATE POTASSIUM 875; 125 MG/1; MG/1
1 TABLET, FILM COATED ORAL 2 TIMES DAILY
Qty: 20 TABLET | Refills: 0 | Status: SHIPPED | OUTPATIENT
Start: 2022-01-10 | End: 2022-01-10 | Stop reason: HOSPADM

## 2022-01-10 RX ORDER — OXYCODONE HYDROCHLORIDE 5 MG/1
5 TABLET ORAL EVERY 4 HOURS PRN
Qty: 7 TABLET | Refills: 0 | Status: SHIPPED | OUTPATIENT
Start: 2022-01-10 | End: 2022-01-10 | Stop reason: HOSPADM

## 2022-01-10 RX ORDER — OXYCODONE HYDROCHLORIDE 5 MG/1
5 TABLET ORAL EVERY 6 HOURS PRN
Qty: 7 TABLET | Refills: 0 | Status: SHIPPED | OUTPATIENT
Start: 2022-01-10 | End: 2022-01-17

## 2022-01-10 RX ADMIN — DOCUSATE SODIUM 100 MG: 100 CAPSULE ORAL at 07:51

## 2022-01-10 RX ADMIN — CIPROFLOXACIN 400 MG: 2 INJECTION, SOLUTION INTRAVENOUS at 09:33

## 2022-01-10 RX ADMIN — METRONIDAZOLE 500 MG: 500 INJECTION, SOLUTION INTRAVENOUS at 04:37

## 2022-01-10 RX ADMIN — METRONIDAZOLE 500 MG: 500 INJECTION, SOLUTION INTRAVENOUS at 10:43

## 2022-01-10 RX ADMIN — SODIUM CHLORIDE, PRESERVATIVE FREE 10 ML: 5 INJECTION INTRAVENOUS at 09:34

## 2022-01-10 RX ADMIN — ACETAMINOPHEN 650 MG: 325 TABLET ORAL at 07:51

## 2022-01-10 ASSESSMENT — PAIN DESCRIPTION - PROGRESSION: CLINICAL_PROGRESSION: GRADUALLY IMPROVING

## 2022-01-10 ASSESSMENT — PAIN - FUNCTIONAL ASSESSMENT: PAIN_FUNCTIONAL_ASSESSMENT: ACTIVITIES ARE NOT PREVENTED

## 2022-01-10 ASSESSMENT — PAIN SCALES - GENERAL: PAINLEVEL_OUTOF10: 1

## 2022-01-10 ASSESSMENT — PAIN DESCRIPTION - PAIN TYPE: TYPE: ACUTE PAIN;SURGICAL PAIN

## 2022-01-10 ASSESSMENT — PAIN DESCRIPTION - FREQUENCY: FREQUENCY: INTERMITTENT

## 2022-01-10 ASSESSMENT — PAIN DESCRIPTION - ONSET: ONSET: ON-GOING

## 2022-01-10 ASSESSMENT — PAIN DESCRIPTION - DESCRIPTORS: DESCRIPTORS: DISCOMFORT

## 2022-01-10 ASSESSMENT — PAIN DESCRIPTION - LOCATION: LOCATION: RECTUM

## 2022-01-10 NOTE — PROGRESS NOTES
Physician Progress Note      Dennys Vera  [facility] #:                  [id number]  :                       1985  ADMIT DATE:       2022 8:52 PM  [address] [place] DATE:  RESPONDING  PROVIDER #:        Shahram Garduno MD          QUERY TEXT:    Patient admitted with perirectal abscess. Per Op note dated   documentation   of I&D. To accurately reflect the procedure performed please further specify the depth   of tissue incised and drained: The medical record reflects the following:  Risk Factors: perirectal abscess  Clinical Indicators: per op note, \"A 1 x 2.5 cm elliptical incision was then   made over the area of fluctuance and a large amount of purulent fluid was   expressed\"  Treatment: IV ABX, cultures, wound dressing  Options provided:  -- Skin only  -- Subcutaneous tissue  -- Fascia  -- Muscle  -- Bone  -- Other - I will add my own diagnosis  -- Disagree - Not applicable / Not valid  -- Disagree - Clinically unable to determine / Unknown  -- Refer to Clinical Documentation Reviewer    PROVIDER RESPONSE TEXT:    Addendum to  procedure note The depth of the drainage to perirectal   abscess was down to and including subcutaneous tissue. Query created by: Cece Motley on 1/10/2022 8:06 AM      QUERY TEXT:    Patient admitted with perirectal abscess. Documentation reflects sepsis in  ED   progress note. If possible, please document in the progress notes and   discharge summary if sepsis was:     The medical record reflects the following:  Risk Factors: perirectal abscess  Clinical Indicators: on admission, WBC 13.8, Cr 1.4, temp 100.2/100.4, HR- 110   - perirectal abscess not healing with PO ABX at home, chills, body aches  Treatment: I&D, IV ABX, wound culture, blood cultures, IVF, Tylenol PO  Options provided:  -- sepsis confirmed after study  -- sepsis  treated and resolved  -- sepsis ruled out after study  -- Other - I will add my own diagnosis  -- Disagree - Not applicable / Not valid  -- Disagree - Clinically unable to determine / Unknown  -- Refer to Clinical Documentation Reviewer    PROVIDER RESPONSE TEXT:    sepsis treated and resolved.     Query created by: Ava Forman on 1/10/2022 8:12 AM      Electronically signed by:  Freeman Crenshaw MD 1/10/2022 9:55 AM

## 2022-01-10 NOTE — PLAN OF CARE
Problem: Pain:  Goal: Pain level will decrease  Description: Pain level will decrease  Outcome: Ongoing   Pain/discomfort being managed with PRN analgesics per MD orders. Pt able to express presence and absence of pain and rate pain appropriately using numerical scale. Problem: Cardiovascular  Goal: No DVT, peripheral vascular complications  Outcome: Ongoing   Lovenox as ordered. Problem: Skin Integrity/Risk  Goal: No skin breakdown during hospitalization  Outcome: Ongoing     Problem: Skin Integrity/Risk  Goal: Wound healing  Outcome: Ongoing   Skin care protocal in place. Antibiotics as ordered. Keep dressing clean and dry.

## 2022-01-10 NOTE — CARE COORDINATION
DISCHARGE SUMMARY     DATE OF DISCHARGE: 1/10/22    DISCHARGE DESTINATION: Home    HOME CARE: No    HEMODIALYSIS: No    TRANSPORTATION: Private Car    NEW DME ORDERED: no    COMMENTS: Wife will transport. Patient denies needs.  Electronically signed by Elyssa Bah RN on 1/10/2022 at 1:22 PM

## 2022-01-10 NOTE — PLAN OF CARE
Problem: Pain:  Description: Pain management should include both nonpharmacologic and pharmacologic interventions.   Goal: Pain level will decrease  Description: Pain level will decrease  Outcome: Ongoing  Goal: Control of acute pain  Description: Control of acute pain  Outcome: Ongoing  Goal: Control of chronic pain  Description: Control of chronic pain  Outcome: Ongoing     Problem: Cardiovascular  Goal: No DVT, peripheral vascular complications  Outcome: Ongoing     Problem: Skin Integrity/Risk  Goal: No skin breakdown during hospitalization  Outcome: Ongoing  Goal: Wound healing  Outcome: Ongoing

## 2022-01-10 NOTE — PROGRESS NOTES
IV removed. Discharge instructions reviewed with pt, all questions and concerns addressed. Verbalized understanding of follow ups needed and to obtain prescriptions from retail pharmacy before 4:30 pm. Pt discharged home with family at this time.

## 2022-01-10 NOTE — PROGRESS NOTES
General Surgery  Daily Progress Note    Pt Name: Clarke Mistry Record Number: [id number]  Date of Birth 1985   Today's Date: 1/10/2022    Chief Complaint   Patient presents with    Abscess     States that he has been seen here twice now and each time he is told if it gets worse to come back. States that now it has gotten bigger and more painful       ASSESSMENT/PLAN  1. Perirectal abscess s/p I&D POD #2 - doing well. Pain resolved. Continue IV abx. Await culture results. Will plan to discharge home later today. augmentin x 10 days. Follow up in 2 weeks, call for appointment       SUBJECTIVE  Gordon has improved from yesterday. Pain is well controlled. He has no nausea and no vomiting. He has passed flatus and has had a bowel movement. He is tolerating regular diet. Current activity is ad jamal    OBJECTIVE  VITALS:  height is 5' 4\" (1.626 m) and weight is 132 lb 4.4 oz (60 kg). His oral temperature is 97.8 °F (36.6 °C). His blood pressure is 137/92 (abnormal) and his pulse is 67. His respiration is 16 and oxygen saturation is 97%. GENERAL: alert, no distress  LUNGS: normal respiratory effort, no accessory muscle use  RECTAL: wound with minimal drainage, no surrounding erythema or fluctuance  EXTREMITY: no cyanosis and no clubbing  I/O last 3 completed shifts: In: 3833.9 [P.O.:1560; I.V.:1441.4; IV Piggyback:832.5]  Out: 3200 [Urine:3200]  No intake/output data recorded. LABS  Recent Labs     01/07/22 2244 01/07/22 2244 01/09/22  0844   WBC 13.8*   < > 13.0*   HGB 13.5   < > 13.7   HCT 40.3*   < > 41.2      < > 178      < > 141   K 4.2  --  4.2   CL 98*   < > 102   CO2 24   < > 29   BUN 12   < > 9   CREATININE 1.4*   < > 1.1   MG  --   --  2.10   PHOS  --   --  3.0   CALCIUM 9.4   < > 9.5   AST 15  --   --    ALT 11  --   --    BILITOT 0.3  --   --     < > = values in this interval not displayed.      CBC with Differential:    Lab Results   Component Value Date    WBC 13.0 01/09/2022    RBC 4.87 01/09/2022    HGB 13.7 01/09/2022    HCT 41.2 01/09/2022     01/09/2022    MCV 84.6 01/09/2022    MCH 28.2 01/09/2022    MCHC 33.3 01/09/2022    RDW 13.0 01/09/2022    SEGSPCT 73.1 03/10/2012    LYMPHOPCT 20.2 01/09/2022    MONOPCT 9.5 01/09/2022    EOSPCT 1.1 03/10/2012    BASOPCT 0.6 01/09/2022    MONOSABS 1.2 01/09/2022    LYMPHSABS 2.6 01/09/2022    EOSABS 0.1 01/09/2022    BASOSABS 0.1 01/09/2022    DIFFTYPE Auto 03/10/2012     BMP:    Lab Results   Component Value Date     01/09/2022    K 4.2 01/09/2022     01/09/2022    CO2 29 01/09/2022    BUN 9 01/09/2022    LABALBU 4.1 01/07/2022    CREATININE 1.1 01/09/2022    CALCIUM 9.5 01/09/2022    GFRAA >60 01/09/2022    GFRAA >60 03/10/2012    LABGLOM >60 01/09/2022    GLUCOSE 107 01/09/2022         Jasmin Carrasco MD  Electronically signed 1/10/2022 at 9:53 AM

## 2022-01-11 LAB
GRAM STAIN RESULT: NORMAL
WOUND/ABSCESS: NORMAL

## 2022-01-11 NOTE — DISCHARGE SUMMARY
General Surgery Discharge Summary        Ronnie Galan   : 1985 MRN: [id number]  Date of Admission: 2022  Date of Discharge: 1/10/2022  Admitting Penny Terry MD  Primary Care Physician: No primary care provider on file. Summary by: Maggie Ro PA-C    Diagnosis Present on Admission:   Perirectal abscess    Secondary diagnosis:   Patient Active Problem List   Diagnosis    Degeneration of C5-C6 intervertebral disc    Neck pain    Perirectal abscess     Procedures: Procedure(s):  PERIRECTAL INCISION AND DRAINAGE    Summary of hospital stay:   Ronnie Galan is a 39 y.o. male who presented with a 1 week history of perirectal abscess. Despite having multiple I&Ds in the emergency department, his pain worsened which led him to come back to be evaluated. He was found to have a 4x5 cm perirectal abscess on CT imaging. The risks, benefits, and alternatives were explained to the patient and he was willing to consent for the procedure. The patient was taken to the operating room where an incision and drainage of perirectal abscess was carried out. He tolerated the procedure well and was transferred to recovery in stable condition. POD#1 the packing was removed and no further purulent drainage, or fluctuance was noted. HE tolerated the increase in his diet and had a BM without significant pain. He did well throughout his hospital stay and was discharged home with antibiotics in stable condition.    Discharge Medications:  Discharge Medication List as of 1/10/2022 12:30 PM      START taking these medications    Details   docusate sodium (COLACE) 100 MG capsule Take 1 capsule by mouth 2 times daily, Disp-30 capsule, R-0Print      amoxicillin-clavulanate (AUGMENTIN) 875-125 MG per tablet Take 1 tablet by mouth 2 times daily for 10 days, Disp-20 tablet, R-0Normal      oxyCODONE (ROXICODONE) 5 MG immediate release tablet Take 1 tablet by mouth every 4 hours as needed for Pain for up to 7 days. , Disp-7 tablet, R-0Normal         STOP taking these medications       oxyCODONE-acetaminophen (PERCOCET) 5-325 MG per tablet Comments:   Reason for Stopping:         HYDROcodone-acetaminophen (Bladimir Sang) 5-325 MG per tablet Comments:   Reason for Stopping:         cephALEXin (KEFLEX) 500 MG capsule Comments:   Reason for Stopping:         sulfamethoxazole-trimethoprim (BACTRIM DS) 800-160 MG per tablet Comments:   Reason for Stopping:               Discharged to:  home    Instruction:  The patient is instructed to return to the hospital or call the office for fevers > 101.5F, inability to tolerate a diet, or unexpected pain. Surgery specific discharge instruction sheet was provided to the patient.   Diet: regular diet   Activity: activity as tolerated    Follow up:  Dr. Meg Mo in 1-2 weeks    Electronically signed by Dane Fleming PA-C on 1/11/2022 at 10:48 AM

## 2022-01-12 LAB — BLOOD CULTURE, ROUTINE: NORMAL

## 2022-01-13 LAB
ANAEROBIC CULTURE: ABNORMAL
GRAM STAIN RESULT: ABNORMAL
ORGANISM: ABNORMAL
WOUND/ABSCESS: ABNORMAL

## 2022-01-27 ENCOUNTER — OFFICE VISIT (OUTPATIENT)
Dept: SURGERY | Age: 37
End: 2022-01-27

## 2022-01-27 VITALS — SYSTOLIC BLOOD PRESSURE: 119 MMHG | DIASTOLIC BLOOD PRESSURE: 84 MMHG | HEART RATE: 98 BPM

## 2022-01-27 DIAGNOSIS — K61.1 PERIRECTAL ABSCESS: Primary | ICD-10-CM

## 2022-01-27 PROCEDURE — 99024 POSTOP FOLLOW-UP VISIT: CPT | Performed by: SURGERY

## 2022-01-27 NOTE — PROGRESS NOTES
Post Operative Visit    [address] [facility] [place]  [facility]   Daron Canela MD    [address], [address]  [place]  Phone: [phone]  Fax: [phone]   YOB: 1985    Date of Visit:  1/27/2022    No ref. provider found  No primary care provider on file. Subjective: Shree Cole presents for a two week follow-up s/p I&D of perirectal abscess. Overall he has been doing well since his operation. His bowel movements have returned to normal.  He still is taking colace intermittently to avoid hard stool  There has been complete resolution of his pain. His wound is no longer draining  He denies any fevers or chills. No Known Allergies  No outpatient medications have been marked as taking for the 1/27/22 encounter (Office Visit) with Carmelina Ybarra MD.       Vitals:    01/27/22 1341   BP: 119/84   Pulse: 98     There is no height or weight on file to calculate BMI. Wt Readings from Last 3 Encounters:   01/10/22 132 lb 4.4 oz (60 kg)   01/06/22 122 lb 11 oz (55.7 kg)   01/05/22 125 lb 10.6 oz (57 kg)     BP Readings from Last 3 Encounters:   01/27/22 119/84   01/10/22 (!) 137/92   01/08/22 126/73          Objective:    CONSTITUTIONAL:  awake, alert, no apparent distress    LUNGS:  Resp easy and unlabored  MUSCULOSKELETAL: No edema  NEUROLOGIC:  Mental Status Exam:  Level of Alertness:   awake  Orientation:   person, place, time  SKIN: posterior perianal wound healed, no erythema or induration, no drainage        ASSESSMENT:     Diagnosis Orders   1. Perirectal abscess         PLAN:    Shree Cole is doing well s/p I&D of perirectal abscess. He has completed his course of antibiotics and does not need any additional antibiotics at this time. Wound has healed well. Will plan on having him follow up prn.       Electronically signed by Carmelina Ybarra MD on 1/27/2022 at 1:51 PM

## 2022-02-07 LAB
FUNGUS (MYCOLOGY) CULTURE: NORMAL
FUNGUS STAIN: NORMAL

## 2022-02-22 LAB
AFB CULTURE (MYCOBACTERIA): NORMAL
AFB SMEAR: NORMAL

## (undated) DEVICE — INTENDED FOR TISSUE SEPARATION, AND OTHER PROCEDURES THAT REQUIRE A SHARP SURGICAL BLADE TO PUNCTURE OR CUT.: Brand: BARD-PARKER ® STAINLESS STEEL BLADES

## (undated) DEVICE — SOLUTION PREP POVIDONE IOD FOR SKIN MUCOUS MEM PRIOR TO

## (undated) DEVICE — GLOVE ORANGE PI 7 1/2   MSG9075

## (undated) DEVICE — MINOR SET UP: Brand: MEDLINE INDUSTRIES, INC.

## (undated) DEVICE — NEEDLE HYPO 25GA L1.5IN BVL ORIENTED ECLIPSE

## (undated) DEVICE — UNDERPAD INCONT XL MESH PROTCT + DISP FOR MAT USE

## (undated) DEVICE — CANISTER, RIGID, 1200CC: Brand: MEDLINE INDUSTRIES, INC.

## (undated) DEVICE — SUTURE VCRL SZ 3-0 L27IN ABSRB UD L26MM SH 1/2 CIR J416H

## (undated) DEVICE — GLOVE SURG SZ 8 L12IN FNGR THK79MIL GRN LTX FREE

## (undated) DEVICE — Z DISCONTINUED BY MEDLINE USE 2711682 TRAY SKIN PREP DRY W/ PREM GLV

## (undated) DEVICE — SOLUTION IV IRRIG POUR BRL 0.9% SODIUM CHL 2F7124

## (undated) DEVICE — SPONGE GZ W4XL4IN COT 12 PLY TYP VII WVN C FLD DSGN

## (undated) DEVICE — SHEET, T, LAPAROTOMY, STERILE: Brand: MEDLINE

## (undated) DEVICE — PAD,ABDOMINAL,8"X7.5",STERILE,LF,1/PK: Brand: MEDLINE

## (undated) DEVICE — GOWN,AURORA,NONREINF,RAGLAN,XXL,STERILE: Brand: MEDLINE